# Patient Record
Sex: MALE | Race: WHITE | NOT HISPANIC OR LATINO | Employment: OTHER | URBAN - METROPOLITAN AREA
[De-identification: names, ages, dates, MRNs, and addresses within clinical notes are randomized per-mention and may not be internally consistent; named-entity substitution may affect disease eponyms.]

---

## 2023-09-02 PROBLEM — E78.5 HYPERLIPIDEMIA: Status: ACTIVE | Noted: 2023-09-02

## 2023-09-02 PROBLEM — M17.12 ARTHRITIS OF LEFT KNEE: Status: ACTIVE | Noted: 2023-09-02

## 2023-09-02 PROBLEM — M23.90 INTERNAL DERANGEMENT OF KNEE JOINT: Status: ACTIVE | Noted: 2023-09-02

## 2023-09-02 PROBLEM — I10 HYPERTENSION: Status: ACTIVE | Noted: 2023-09-02

## 2023-09-02 PROBLEM — M17.0 OSTEOARTHRITIS OF KNEES, BILATERAL: Status: ACTIVE | Noted: 2023-09-02

## 2023-09-02 RX ORDER — LANOLIN ALCOHOL/MO/W.PET/CERES
1000 CREAM (GRAM) TOPICAL DAILY
COMMUNITY
End: 2023-10-13 | Stop reason: WASHOUT

## 2023-09-02 RX ORDER — HYDROCHLOROTHIAZIDE 25 MG/1
25 TABLET ORAL DAILY
COMMUNITY
End: 2023-12-08

## 2023-09-02 RX ORDER — ZINC GLUCONATE 50 MG
50 TABLET ORAL DAILY
COMMUNITY
End: 2023-12-11 | Stop reason: WASHOUT

## 2023-09-02 RX ORDER — TRAMADOL HYDROCHLORIDE 50 MG/1
50 TABLET ORAL EVERY 6 HOURS
COMMUNITY
Start: 2022-07-28 | End: 2023-10-13 | Stop reason: WASHOUT

## 2023-09-02 RX ORDER — LISINOPRIL 5 MG/1
5 TABLET ORAL DAILY
COMMUNITY
End: 2023-10-13 | Stop reason: SDUPTHER

## 2023-09-02 RX ORDER — ATORVASTATIN CALCIUM 20 MG/1
20 TABLET, FILM COATED ORAL DAILY
COMMUNITY
End: 2023-10-13

## 2023-10-13 PROBLEM — M48.061 SPINAL STENOSIS, LUMBAR REGION, WITHOUT NEUROGENIC CLAUDICATION: Status: ACTIVE | Noted: 2020-07-13

## 2023-10-13 PROBLEM — R73.03 PREDIABETES: Status: ACTIVE | Noted: 2020-07-13

## 2023-10-13 PROBLEM — M75.112 NONTRAUMATIC INCOMPLETE TEAR OF LEFT ROTATOR CUFF: Status: ACTIVE | Noted: 2020-07-13

## 2023-10-13 PROBLEM — G47.33 OSA (OBSTRUCTIVE SLEEP APNEA): Status: ACTIVE | Noted: 2020-07-13

## 2023-10-13 RX ORDER — LISINOPRIL 5 MG/1
5 TABLET ORAL DAILY
Qty: 90 TABLET | Refills: 3 | OUTPATIENT
Start: 2023-10-13

## 2023-12-08 DIAGNOSIS — I10 PRIMARY HYPERTENSION: Primary | ICD-10-CM

## 2023-12-08 PROBLEM — M25.569 KNEE PAIN: Status: RESOLVED | Noted: 2022-07-28 | Resolved: 2023-12-08

## 2023-12-08 PROBLEM — G89.29 CHRONIC LOW BACK PAIN: Status: ACTIVE | Noted: 2022-12-29

## 2023-12-08 PROBLEM — M54.50 CHRONIC LOW BACK PAIN: Status: ACTIVE | Noted: 2022-12-29

## 2023-12-08 RX ORDER — LANOLIN ALCOHOL/MO/W.PET/CERES
100 CREAM (GRAM) TOPICAL DAILY
COMMUNITY
End: 2023-12-11 | Stop reason: WASHOUT

## 2023-12-08 RX ORDER — HYDROCHLOROTHIAZIDE 25 MG/1
TABLET ORAL
Qty: 90 TABLET | Refills: 0 | Status: SHIPPED | OUTPATIENT
Start: 2023-12-08 | End: 2024-02-14

## 2023-12-09 ASSESSMENT — PROMIS GLOBAL HEALTH SCALE
EMOTIONAL_PROBLEMS: RARELY
CARRYOUT_PHYSICAL_ACTIVITIES: COMPLETELY
RATE_GENERAL_HEALTH: GOOD
CARRYOUT_SOCIAL_ACTIVITIES: GOOD
RATE_PHYSICAL_HEALTH: GOOD
RATE_QUALITY_OF_LIFE: FAIR
RATE_MENTAL_HEALTH: FAIR
RATE_SOCIAL_SATISFACTION: FAIR
RATE_AVERAGE_FATIGUE: MILD
RATE_AVERAGE_PAIN: 5

## 2023-12-11 ENCOUNTER — LAB (OUTPATIENT)
Dept: LAB | Facility: LAB | Age: 63
End: 2023-12-11
Payer: COMMERCIAL

## 2023-12-11 ENCOUNTER — OFFICE VISIT (OUTPATIENT)
Dept: PRIMARY CARE | Facility: CLINIC | Age: 63
End: 2023-12-11
Payer: COMMERCIAL

## 2023-12-11 VITALS
DIASTOLIC BLOOD PRESSURE: 60 MMHG | OXYGEN SATURATION: 97 % | HEART RATE: 59 BPM | BODY MASS INDEX: 32.23 KG/M2 | WEIGHT: 238 LBS | HEIGHT: 72 IN | SYSTOLIC BLOOD PRESSURE: 112 MMHG | TEMPERATURE: 97.7 F

## 2023-12-11 DIAGNOSIS — Z12.5 SCREENING FOR PROSTATE CANCER: ICD-10-CM

## 2023-12-11 DIAGNOSIS — G47.33 OSA (OBSTRUCTIVE SLEEP APNEA): ICD-10-CM

## 2023-12-11 DIAGNOSIS — I10 PRIMARY HYPERTENSION: ICD-10-CM

## 2023-12-11 DIAGNOSIS — Z13.1 SCREENING FOR DIABETES MELLITUS: ICD-10-CM

## 2023-12-11 DIAGNOSIS — Z00.00 ANNUAL PHYSICAL EXAM: Primary | ICD-10-CM

## 2023-12-11 DIAGNOSIS — Z71.3 DIETARY COUNSELING: ICD-10-CM

## 2023-12-11 LAB
ALBUMIN SERPL-MCNC: 4.7 G/DL (ref 3.5–5)
ALP BLD-CCNC: 71 U/L (ref 35–125)
ALT SERPL-CCNC: 27 U/L (ref 5–40)
ANION GAP SERPL CALC-SCNC: 11 MMOL/L
AST SERPL-CCNC: 26 U/L (ref 5–40)
BILIRUB SERPL-MCNC: 0.9 MG/DL (ref 0.1–1.2)
BUN SERPL-MCNC: 14 MG/DL (ref 8–25)
CALCIUM SERPL-MCNC: 9.9 MG/DL (ref 8.5–10.4)
CHLORIDE SERPL-SCNC: 102 MMOL/L (ref 97–107)
CHOLEST SERPL-MCNC: 169 MG/DL (ref 133–200)
CHOLEST/HDLC SERPL: 3.5 {RATIO}
CO2 SERPL-SCNC: 28 MMOL/L (ref 24–31)
CREAT SERPL-MCNC: 1.1 MG/DL (ref 0.4–1.6)
ERYTHROCYTE [DISTWIDTH] IN BLOOD BY AUTOMATED COUNT: 12.6 % (ref 11.5–14.5)
EST. AVERAGE GLUCOSE BLD GHB EST-MCNC: 120 MG/DL
GFR SERPL CREATININE-BSD FRML MDRD: 75 ML/MIN/1.73M*2
GLUCOSE SERPL-MCNC: 107 MG/DL (ref 65–99)
HBA1C MFR BLD: 5.8 %
HCT VFR BLD AUTO: 47.2 % (ref 41–52)
HDLC SERPL-MCNC: 48 MG/DL
HGB BLD-MCNC: 15.9 G/DL (ref 13.5–17.5)
LDLC SERPL CALC-MCNC: 99 MG/DL (ref 65–130)
MCH RBC QN AUTO: 29.6 PG (ref 26–34)
MCHC RBC AUTO-ENTMCNC: 33.7 G/DL (ref 32–36)
MCV RBC AUTO: 88 FL (ref 80–100)
NRBC BLD-RTO: 0 /100 WBCS (ref 0–0)
PLATELET # BLD AUTO: 254 X10*3/UL (ref 150–450)
POTASSIUM SERPL-SCNC: 4.7 MMOL/L (ref 3.4–5.1)
PROT SERPL-MCNC: 6.8 G/DL (ref 5.9–7.9)
PSA SERPL-MCNC: 0.4 NG/ML
RBC # BLD AUTO: 5.37 X10*6/UL (ref 4.5–5.9)
SODIUM SERPL-SCNC: 141 MMOL/L (ref 133–145)
TRIGL SERPL-MCNC: 112 MG/DL (ref 40–150)
WBC # BLD AUTO: 7.1 X10*3/UL (ref 4.4–11.3)

## 2023-12-11 PROCEDURE — 1036F TOBACCO NON-USER: CPT | Performed by: INTERNAL MEDICINE

## 2023-12-11 PROCEDURE — 83036 HEMOGLOBIN GLYCOSYLATED A1C: CPT

## 2023-12-11 PROCEDURE — 84153 ASSAY OF PSA TOTAL: CPT

## 2023-12-11 PROCEDURE — 3078F DIAST BP <80 MM HG: CPT | Performed by: INTERNAL MEDICINE

## 2023-12-11 PROCEDURE — 3008F BODY MASS INDEX DOCD: CPT | Performed by: INTERNAL MEDICINE

## 2023-12-11 PROCEDURE — 80061 LIPID PANEL: CPT

## 2023-12-11 PROCEDURE — 36415 COLL VENOUS BLD VENIPUNCTURE: CPT

## 2023-12-11 PROCEDURE — 99396 PREV VISIT EST AGE 40-64: CPT | Performed by: INTERNAL MEDICINE

## 2023-12-11 PROCEDURE — 3074F SYST BP LT 130 MM HG: CPT | Performed by: INTERNAL MEDICINE

## 2023-12-11 PROCEDURE — 80053 COMPREHEN METABOLIC PANEL: CPT

## 2023-12-11 PROCEDURE — 85027 COMPLETE CBC AUTOMATED: CPT

## 2023-12-11 RX ORDER — DEXTROMETHORPHAN HYDROBROMIDE, GUAIFENESIN 5; 100 MG/5ML; MG/5ML
650 LIQUID ORAL EVERY 8 HOURS PRN
COMMUNITY

## 2023-12-11 ASSESSMENT — PATIENT HEALTH QUESTIONNAIRE - PHQ9
2. FEELING DOWN, DEPRESSED OR HOPELESS: NOT AT ALL
SUM OF ALL RESPONSES TO PHQ9 QUESTIONS 1 AND 2: 0
1. LITTLE INTEREST OR PLEASURE IN DOING THINGS: NOT AT ALL

## 2023-12-11 ASSESSMENT — ENCOUNTER SYMPTOMS
LOSS OF SENSATION IN FEET: 0
OCCASIONAL FEELINGS OF UNSTEADINESS: 0
DEPRESSION: 0

## 2023-12-11 ASSESSMENT — PAIN SCALES - GENERAL: PAINLEVEL: 0-NO PAIN

## 2023-12-11 NOTE — PROGRESS NOTES
[facility]: [facility]  PROGRESS NOTE      Terrell Vernon is a 63 y.o. male that is being seen  today for Annual Exam (CPE).  Subjective   Pt. Is being seen for annual physical exam.Pt. has been doing well.Advance directives discussed with patient   Denies any hospitalisation or urgent care visit.  Previous Labs reviewed .  Pt. Is upto date on screening exams and vaccination  Denies any falls or hospitalisation.     Patient's blood pressure is fairly controlled.  Patient is due for his repeat lab work.      ROS  Negative for fever or chills  Negative for sore throat, ear pain, nasal discharge  Negative for cough, shortness of breath or wheezing  Negative for chest pain, palpitations, swelling of legs  Negative for abdominal pain, constipation, diarrhea, blood in the stools  Negative for urinary complaints  Negative for headache, dizziness, weakness or numbness  Negative for joint pain  Negative for depression or anxiety  All other systems reviewed and were negative   Vitals:    12/11/23 0800   BP: 112/60   Pulse: 59   Temp: 36.5 °C (97.7 °F)   SpO2: 97%      Vitals:    12/11/23 0800   Weight: 108 kg (238 lb)     Body mass index is 32.27 kg/m².  Physical Exam  Constitutional: Patient does not appear to be in any acute distress  Head and Face: NCAT  Eyes: Normal external exam, EOMI  ENT: Normal external inspection of ears and nose. Oropharynx normal.  Cardiovascular: RRR, S1/S2, no murmurs, rubs, or gallops, radial pulses +2, no edema of extremities  Pulmonary: CTAB, no respiratory distress.  Abdomen: +BS, soft, non-tender, nondistended, no guarding or rebound, no masses noted  MSK: No joint swelling, normal movements of all extremities. Range of motion- normal.  Skin- No lesions, contusions, or erythema.  Peripheral puslses palpable bilaterally 2+  Neuro: AAO X3, Cranial nerves 2-12 grossly intact,DTR 2+ in all 4 limbs   Psychiatric: Judgment intact. Appropriate mood and behavior    LABS    [unfilled]  Lab Results   Component Value Date    GLUCOSE 96 12/29/2022    CALCIUM 9.2 12/29/2022     12/29/2022    K 3.7 12/29/2022    CO2 29 12/29/2022    CL 99 12/29/2022    BUN 16 12/29/2022    CREATININE 1.01 12/29/2022     Lab Results   Component Value Date    ALT 23 12/29/2022    AST 25 12/29/2022    ALKPHOS 70 12/29/2022    BILITOT 0.8 12/29/2022     Lab Results   Component Value Date    WBC 6.3 12/29/2022    HGB 15.0 12/29/2022    HCT 44.7 12/29/2022    MCV 86 12/29/2022     12/29/2022     Lab Results   Component Value Date    CHOL 130 (L) 12/08/2022     Lab Results   Component Value Date    HDL 40 (L) 12/08/2022     Lab Results   Component Value Date    LDLCALC 74 12/08/2022     Lab Results   Component Value Date    TRIG 80 12/08/2022     Lab Results   Component Value Date    HGBA1C 6.0 12/08/2022     Other labs not included in the list above were reviewed either before or during this encounter.    History    Past Medical History:   Diagnosis Date    Hyperlipidemia     Hypertension 07/2017    Knee pain 07/28/2022     Past Surgical History:   Procedure Laterality Date    JOINT REPLACEMENT  1/9/23    VASECTOMY  06/1996     Family History   Problem Relation Name Age of Onset    Cancer Mother      Other (Bladder cancer) Mother      Hypertension Father      Dementia Father       No Known Allergies  Current Outpatient Medications on File Prior to Visit   Medication Sig Dispense Refill    acetaminophen (Tylenol 8 HOUR) 650 mg ER tablet Take 1 tablet (650 mg) by mouth every 8 hours if needed for mild pain (1 - 3). Do not crush, chew, or split.      atorvastatin (Lipitor) 20 mg tablet TAKE 1 TABLET BY MOUTH DAILY 90 tablet 0    hydroCHLOROthiazide (HYDRODiuril) 25 mg tablet TAKE 1 TABLET BY MOUTH IN THE  MORNING DAILY 90 tablet 0    lisinopril 5 mg tablet Take 1 tablet (5 mg) by mouth once daily. 90 tablet 0    [DISCONTINUED] cyanocobalamin (Vitamin B-12) 1,000 mcg tablet Take 100 mcg by mouth once  daily.      [DISCONTINUED] hydroCHLOROthiazide (HYDRODiuril) 25 mg tablet Take 1 tablet (25 mg) by mouth once daily.      [DISCONTINUED] zinc gluconate 50 mg tablet Take 1 tablet (50 mg of elemental zinc) by mouth once daily.       No current facility-administered medications on file prior to visit.     Immunization History   Administered Date(s) Administered    Flu vaccine (IIV4), preservative free *Check age/dose* 09/26/2020, 09/13/2022, 10/06/2023    Moderna SARS-CoV-2 Vaccination 12/08/2021    Pfizer COVID-19 vaccine, Fall 2023, 12 years and older, (30mcg/0.3mL) 10/06/2023    Pfizer COVID-19 vaccine, bivalent, age 12 years and older (30 mcg/0.3 mL) 09/13/2022    Pfizer Purple Cap SARS-CoV-2 03/11/2021, 04/01/2021    Tdap vaccine, age 7 year and older (BOOSTRIX) 07/13/2020    Zoster vaccine, recombinant, adult (SHINGRIX) 01/04/2022, 03/07/2022     Patient's medical history was reviewed and updated either before or during this encounter.  ASSESSMENT / PLAN:  Diagnoses and all orders for this visit:  Annual physical exam  FIDEL (obstructive sleep apnea)  -     Referral to Adult Sleep Medicine; Future  Primary hypertension  -     CBC; Future  -     Comprehensive Metabolic Panel; Future  -     Lipid Panel; Future  Dietary counseling  -     Referral to Nutrition Services; Future  Screening for diabetes mellitus  -     Hemoglobin A1C; Future  Screening for prostate cancer  -     Prostate Specific Antigen; Future          gK Jones MD

## 2023-12-12 ENCOUNTER — TELEPHONE (OUTPATIENT)
Dept: PRIMARY CARE | Facility: CLINIC | Age: 63
End: 2023-12-12
Payer: COMMERCIAL

## 2023-12-19 DIAGNOSIS — E78.5 HYPERLIPIDEMIA, UNSPECIFIED HYPERLIPIDEMIA TYPE: ICD-10-CM

## 2023-12-19 DIAGNOSIS — I10 PRIMARY HYPERTENSION: ICD-10-CM

## 2023-12-20 RX ORDER — LISINOPRIL 5 MG/1
5 TABLET ORAL DAILY
Qty: 90 TABLET | Refills: 3 | Status: SHIPPED | OUTPATIENT
Start: 2023-12-20

## 2023-12-20 RX ORDER — ATORVASTATIN CALCIUM 20 MG/1
20 TABLET, FILM COATED ORAL DAILY
Qty: 90 TABLET | Refills: 3 | Status: SHIPPED | OUTPATIENT
Start: 2023-12-20

## 2023-12-29 DIAGNOSIS — G89.29 CHRONIC LOW BACK PAIN, UNSPECIFIED BACK PAIN LATERALITY, UNSPECIFIED WHETHER SCIATICA PRESENT: Primary | ICD-10-CM

## 2023-12-29 DIAGNOSIS — M54.50 CHRONIC LOW BACK PAIN, UNSPECIFIED BACK PAIN LATERALITY, UNSPECIFIED WHETHER SCIATICA PRESENT: Primary | ICD-10-CM

## 2023-12-29 RX ORDER — MELOXICAM 15 MG/1
15 TABLET ORAL DAILY
Qty: 30 TABLET | Refills: 3 | Status: SHIPPED | OUTPATIENT
Start: 2023-12-29 | End: 2024-01-03 | Stop reason: SDUPTHER

## 2023-12-29 RX ORDER — MELOXICAM 15 MG/1
15 TABLET ORAL DAILY
Qty: 30 TABLET | Refills: 3 | Status: SHIPPED | OUTPATIENT
Start: 2023-12-29 | End: 2023-12-29

## 2024-01-03 DIAGNOSIS — G89.29 CHRONIC LOW BACK PAIN, UNSPECIFIED BACK PAIN LATERALITY, UNSPECIFIED WHETHER SCIATICA PRESENT: ICD-10-CM

## 2024-01-03 DIAGNOSIS — M54.50 CHRONIC LOW BACK PAIN, UNSPECIFIED BACK PAIN LATERALITY, UNSPECIFIED WHETHER SCIATICA PRESENT: ICD-10-CM

## 2024-01-03 RX ORDER — MELOXICAM 15 MG/1
15 TABLET ORAL DAILY
Qty: 30 TABLET | Refills: 3 | Status: SHIPPED | OUTPATIENT
Start: 2024-01-03 | End: 2024-05-02

## 2024-01-11 NOTE — PROGRESS NOTES
Patient: Terrell Veronn    [id number]  : 1960 -- AGE 63 y.o.    Provider: Sotero Chu MD     [facility]   Service Date: 2024              [facility]  New Visit Note        The patient's referring provider is: Kg Jones MD    HPI:  Terrell Vernon is a 63 y.o. male with severe sleep apnea on CPAP, with PMH notable for HTN, HLD, prediabetes, and spinal stenosis, who presents today to establish care for his sleep apnea. His goal today is to make sure that his machine is working properly and if he needs a new one.    Snoring and nocturnal heartburn prior to starting CPAP. With CPAP there are no witnessed apneas, waking gasping/choking, night sweats, morning headaches, or morning sore throat. He wakes 1-3 times per night, occasionally has nocturia, occasional bruxism, and occasional morning dry mouth. Sleep is restless due to back pain causing him to change positions frequently.    Sleep walking: No  Dream enactment: No  Sleep paralysis: occasionally dreams that he is paralyzed   Hypnagogic/hypnopompic hallucinations: No    Mask: full face    Using CPAP nightly with no issues. This is his second machine and it is 4.5 years old. He has his original machine as a back-up in case he ever needs.     Patient is keeping the equipment clean and supplies are being renewed at appropriate intervals. Uses a So Clean machine.    DAYTIME SYMPTOMS  [place]: 0  CHASTITY: 10/28  Daytime sleepiness: No  Fatigue: No  Trouble with memory/concentration: No  Dozing: No  Feeling sleepy while driving: Denies    RLS symptoms: No   Cataplexy: No    SLEEP HABITS:   Melatonin 5 mg nightly  Preferred sleep position: starts the night supine, falls asleep on side  Bedtime: 10 pm, sleep latency up to an hour. Bedtime is 10 pm to 12 am on weekends, usually asleep by 11 am  Wake time: 7-8 am every day  # of nocturnal awakenings: 1-3 due to back pain  Napping: no  Total estimated sleep  per 24 hrs: 7-8 hours    PRIOR SLEEP STUDIES:  Split night PSG at [facility], 7/11/2012: pt's weighed 245 lbs. Symptoms were snoring, difficulty initiating and maintainig sleep, witnesses pauses in breathing, and daytime somnolence. Severe sleep apnea with AHI4% 45.6/h with NAM 22.9/h during diagnostic portion, worse when supine (93.5/h during the 61.0 minutes supine vs 9.6/h during the 67.0 minutes non-supine). Severe respiratory related sleep fragmentation with 79 arousals/h. Loud snoring. SpO2 lucero 87%, baseline 96%. CPAP mask was the Mirage Quattro full face mask (medium) due to mouth breathing. AutoCPAP 7-11 cm H2O reduced AHI to 3.6/h. During the treatment portion the respiratory events consisted of 10 central apneas and arousal index decreased overall to 14/h. Leg movement index during diagnostic portion was 6.1/h and 9.7/h during the CPAP portion.        Patient Active Problem List   Diagnosis    Arthritis of left knee    Hyperlipidemia    Hypertension    Internal derangement of knee joint    Osteoarthritis of knees, bilateral    Nontraumatic incomplete tear of left rotator cuff    FIDEL (obstructive sleep apnea)    Prediabetes    Spinal stenosis, lumbar region, without neurogenic claudication    Chronic low back pain     Past Medical History:   Diagnosis Date    Hyperlipidemia     Hypertension 07/2017    Knee pain 07/28/2022     Past Surgical History:   Procedure Laterality Date    JOINT REPLACEMENT  1/9/23    VASECTOMY  06/1996    WISDOM TOOTH EXTRACTION  6/86     Current Outpatient Medications   Medication Sig Dispense Refill    acetaminophen (Tylenol 8 HOUR) 650 mg ER tablet Take 1 tablet (650 mg) by mouth every 8 hours if needed for mild pain (1 - 3). Do not crush, chew, or split.      atorvastatin (Lipitor) 20 mg tablet TAKE 1 TABLET BY MOUTH ONCE  DAILY 90 tablet 3    hydroCHLOROthiazide (HYDRODiuril) 25 mg tablet TAKE 1 TABLET BY MOUTH IN THE  MORNING DAILY 90 tablet 0  "   lisinopril 5 mg tablet TAKE 1 TABLET BY MOUTH ONCE  DAILY 90 tablet 3    meloxicam (Mobic) 15 mg tablet Take 1 tablet (15 mg) by mouth once daily. 30 tablet 3     No current facility-administered medications for this visit.     No Known Allergies    FAMILY HISTORY OF SLEEP DISORDERS: no known family history of sleep apnea  Family History   Problem Relation Name Age of Onset    Cancer Mother      Other (Bladder cancer) Mother      Hypertension Father      Dementia Father         SOCIAL HISTORY  Employment: retired   Lives with: wife  Alcohol: 2 times/week  Cigarettes: never. Quit chew tobacco pouches in 8/22  Illicits: none  Caffeine: 3 cups/coffee/day     ROS: 12 point ROS positive for joint pain that affects sleep and muscle pain/cramps - shoulder, knee, back pains.  All other items/systems were reviewed and are negative.    PHYSICAL EXAMINATION:   Vitals:    01/12/24 0812   BP: 126/79   BP Location: Left arm   Patient Position: Sitting   BP Cuff Size: Large adult   Pulse: 60   SpO2: 95%   Weight: 109 kg (240 lb)   Height: 1.905 m (6' 3\")     Body mass index is 30 kg/m².  General: Awake. Alert. Comfortable. No apparent distress.   Speech: Normal  Comprehension: Normal  Mood: Stable  Affect: Appropriate  Eyes:   Eyelids: normal            ENT:          Nares patent bilaterally. Septum deviation to the right. Casas tongue position III. Tongue scalloping not present, tongue is enlarged, soft palate is mildly elongated, hard palate is not high arched. Uvula is not enlarged. Retrognathia is not present. Tonsils are not enlarged. Dentition very good.           Neck:          Circumference: mildly large in caliber  Cardiac: Regular in rate and rhythm. No murmurs. No edema in bilateral lower extremities.  Pul:         Clear to auscultation bilaterally. Normal respiratory effort   Abd:         obese  Neuro: Alert, well-oriented. Cranial nerves II-XII grossly normal and symmetric.  Moves all limbs " symmetrically with no evidence of significant focal weakness. No abnormal movements noted. Normal gait      CPAP download directly from machine today:  Device: ResMed AirSense 10 AutoSet  DME: [employer]  Setup date: per pt - 2019  Date range: last 30 days  Days used: 100%  Days used >4 hours: 100%  Average usage (days used): 9.1 h  Settin-20 cm H2O, EPR 2  Pressure: 95th %ile 11.5 cm H2O  Leak: 16 L/min (95th %ile)  AHI: 1.5      LABS/DIAGNOSTICS:  Lab Results   Component Value Date    HGB 15.9 2023    CO2 28 2023    HGBA1C 5.8 (H) 2023        ASSESSMENT AND PLAN: Mr. Terrell Vernon is a 63 y.o. male with a history of severe sleep apnea doing well on CPAP.     #FIDEL  -Patient's sleep apnea is under very good control with CPAP, he is deriving significant subjective benefit from treatment, his compliance is excellent, and mask leak is well controlled overall.  -continue current CPAP settings  -advised pt on signs of machine malfunction to look out for to indicate that he needs a new machine  -advised cleaning with just gentle soap and water  -We discussed the risk factors for sleep apnea, pathophysiology of sleep apnea, treatment options, and potential long-term complications of untreated FIDEL, including cardiovascular and metabolic complications.      All of the above was discussed with the patient in detail. He voiced an understanding of the above and was agreeable to proceed further as advised. Procedure for the sleep study was discussed with him.  60 minutes were spent on this encounter, including time reviewing the chart, reviewing prior sleep study, conducting the H&P, counseling the patient, and documenting.    FOLLOW UP:  1 year, sooner if needed

## 2024-01-12 ENCOUNTER — OFFICE VISIT (OUTPATIENT)
Dept: SLEEP MEDICINE | Facility: CLINIC | Age: 64
End: 2024-01-12
Payer: COMMERCIAL

## 2024-01-12 VITALS
HEIGHT: 75 IN | HEART RATE: 60 BPM | SYSTOLIC BLOOD PRESSURE: 126 MMHG | DIASTOLIC BLOOD PRESSURE: 79 MMHG | OXYGEN SATURATION: 95 % | WEIGHT: 240 LBS | BODY MASS INDEX: 29.84 KG/M2

## 2024-01-12 DIAGNOSIS — G47.33 OSA (OBSTRUCTIVE SLEEP APNEA): ICD-10-CM

## 2024-01-12 PROCEDURE — 99205 OFFICE O/P NEW HI 60 MIN: CPT | Performed by: PSYCHIATRY & NEUROLOGY

## 2024-01-12 PROCEDURE — 1036F TOBACCO NON-USER: CPT | Performed by: PSYCHIATRY & NEUROLOGY

## 2024-01-12 PROCEDURE — 3078F DIAST BP <80 MM HG: CPT | Performed by: PSYCHIATRY & NEUROLOGY

## 2024-01-12 PROCEDURE — 3074F SYST BP LT 130 MM HG: CPT | Performed by: PSYCHIATRY & NEUROLOGY

## 2024-01-12 RX ORDER — ACETAMINOPHEN 500 MG
5 TABLET ORAL NIGHTLY PRN
COMMUNITY

## 2024-01-12 ASSESSMENT — ENCOUNTER SYMPTOMS
LOSS OF SENSATION IN FEET: 0
OCCASIONAL FEELINGS OF UNSTEADINESS: 0
DEPRESSION: 0

## 2024-01-15 ENCOUNTER — OFFICE VISIT (OUTPATIENT)
Dept: PAIN MEDICINE | Facility: HOSPITAL | Age: 64
End: 2024-01-15
Payer: COMMERCIAL

## 2024-01-15 DIAGNOSIS — M48.061 SPINAL STENOSIS, LUMBAR REGION, WITHOUT NEUROGENIC CLAUDICATION: Primary | ICD-10-CM

## 2024-01-15 PROCEDURE — 1036F TOBACCO NON-USER: CPT | Performed by: ANESTHESIOLOGY

## 2024-01-15 PROCEDURE — 99204 OFFICE O/P NEW MOD 45 MIN: CPT | Performed by: ANESTHESIOLOGY

## 2024-01-15 PROCEDURE — 99214 OFFICE O/P EST MOD 30 MIN: CPT | Performed by: ANESTHESIOLOGY

## 2024-01-15 RX ORDER — GABAPENTIN 300 MG/1
CAPSULE ORAL
Qty: 180 CAPSULE | Refills: 0 | Status: SHIPPED | OUTPATIENT
Start: 2024-01-15

## 2024-01-15 ASSESSMENT — PAIN - FUNCTIONAL ASSESSMENT: PAIN_FUNCTIONAL_ASSESSMENT: 0-10

## 2024-01-15 ASSESSMENT — PAIN SCALES - GENERAL: PAINLEVEL_OUTOF10: 6

## 2024-01-15 NOTE — PROGRESS NOTES
Subjective   Patient ID: Terrell Vernon is a 63 y.o. male.    HPI  63-year-old male presenting as a new patient to our clinic today. Patient presents complaining of lower back pain that radiates into the anterior left thigh to his knee. He has a history of a left knee replacement in January 9 of last year. He is a very active gentleman who is a retired  and walks 3 to 4 miles every day. Now, he complains that he has significant 8/10 pain after standing and walking for period of time. The pain is relieved with sitting. He also endorses some numbness/tingling in his anterior thigh. He does physical therapy stretches and exercises on a daily basis. He only takes meloxicam for pain which he says does not help. MRI in December 2021 shows bilateral foraminal stenosis at L1-2 as well as severe left foraminal stenosis at L4-5 with Modic type II changes at L4-5 and L5-S1.  Review of Systems   All other systems reviewed and are negative.      Objective   Physical Exam  PHYSICAL EXAM  Vitals signs reviewed  Constitutional:       General: Not in acute distress     Appearance: Normal appearance. Not ill-appearing.  HENT:     Head: Normocephalic and atraumatic  Eyes:     Conjunctiva/sclera: Conjunctivae normal  Cardiovascular:     Rate and Rhythm: Normal rate and regular rhythm  Pulmonary:     Effort: No respiratory distress  Abdominal:     Palpations: Abdomen is soft  Musculoskeletal: LYONS  Skin:     General: Skin is warm and dry  Neurological:     General: No focal deficit present  Psychiatric:         Mood and Affect: Mood normal         Behavior: Behavior normal     Advanced Exam   Inspection: No gross deformities, no surgical scars  Palpation: No tenderness of patient of lumbar midline, lumbar paraspinals, bilateral SI joints  ROM: Normal range of motion of the lumbar flexion extension  Motor: 5-5 strength upper and lower extremities  Sensory: Negative for sensory abnormalities in upper and lower  extremities  Reflexes: 2+ reflexes bilateral upper and lower extremities  Negative Babinski, negative Javi, negative Romberg, no clonus appreciated on exam     Lumbar: Negative straight leg raising on the right and positive on the left, negative for facet loading     Sacral: Negative Franklin, negative Gaenslen's     Piriformis: Negative pain with deep palpation of bilateral piriformis muscles, negative FAIR, negative pace     Hip: Negative for pain with anterior, lateral, posterior palpation of hip joints, negative FADIR, negative for internal/external rotation of the hip, negative logroll      Assessment/Plan   Diagnoses and all orders for this visit:  Spinal stenosis, lumbar region, without neurogenic claudication  -     FL pain management TC; Future  -     Transforaminal; Future  Terrell Vernon is a pleasant 63-year-old male presenting as a new patient in our clinic today. Patient has history of a left knee replacement in January of last year and complains of low back pain that radiates down the left leg to just before the knee and the L2 dermatomal distribution. He performed physical therapy stretches and exercises on a daily basis and takes meloxicam for pain. We discussed with the patient that his history, imaging, and physical exam findings are most consistent with neurogenic claudication due to lumbar spinal stenosis. We discussed the mainstay of treatment in the form of physical therapy, medication optimization, as well as interventional injections. We discussed the risks and benefits of doing the injection as well as the side effects of gabapentin. The patient is understanding and wishes to proceed.    Plan:  Consider Left L1/2 the pain seems to radiate in the L1/L2 distribution (the patient has disc displacement of the left side at L1/2 with moderate central canal stenosis), L4/5 and L5/S1 (the patient has severe left-sided L4/5 and L5/S1 foraminal stenosis but the radicular pain stops just above the  knee) transforaminal epidural steroid injection. Patient does not take any blood thinners.  Gabapentin up titration from 300 mg nightly to be increased 600mg three times daily.  Goals of therapy, the dosages and side effects of the medication were discussed in detail with the patient.  The patient understands and agrees to take the medication as prescribed.  We discussed that if we discontinue this in the future, it would need to be weaned down rather than stopped abruptly.  Instructions and a hardcopy form were given to the patient and explained.  We discussed continuing physical therapy to help manage the patient's painful condition.  The patient was counseled that core muscle strengthening will improve the long term prognosis in regards to pain and may also increase range of motion and mobility.  The patient's questions were answered and she was agreeable to this course.    All the patient's questions and concerns were answered. He was invited to follow-up with any issues.

## 2024-01-30 ENCOUNTER — APPOINTMENT (OUTPATIENT)
Dept: RADIOLOGY | Facility: HOSPITAL | Age: 64
End: 2024-01-30
Payer: COMMERCIAL

## 2024-02-12 ENCOUNTER — OFFICE VISIT (OUTPATIENT)
Dept: PAIN MEDICINE | Facility: HOSPITAL | Age: 64
End: 2024-02-12
Payer: COMMERCIAL

## 2024-02-12 DIAGNOSIS — M48.062 SPINAL STENOSIS, LUMBAR REGION WITH NEUROGENIC CLAUDICATION: Primary | ICD-10-CM

## 2024-02-12 PROCEDURE — 99213 OFFICE O/P EST LOW 20 MIN: CPT | Performed by: ANESTHESIOLOGY

## 2024-02-12 PROCEDURE — 1036F TOBACCO NON-USER: CPT | Performed by: ANESTHESIOLOGY

## 2024-02-12 RX ORDER — DESIPRAMINE HYDROCHLORIDE 10 MG/1
10 TABLET ORAL NIGHTLY
Qty: 30 TABLET | Refills: 11 | Status: SHIPPED | OUTPATIENT
Start: 2024-02-12 | End: 2025-02-11

## 2024-02-12 RX ORDER — GABAPENTIN 600 MG/1
600 TABLET ORAL 3 TIMES DAILY
Qty: 90 TABLET | Refills: 5 | Status: SHIPPED | OUTPATIENT
Start: 2024-02-12 | End: 2024-08-10

## 2024-02-12 ASSESSMENT — PAIN SCALES - GENERAL: PAINLEVEL: 6

## 2024-02-12 NOTE — PROGRESS NOTES
Subjective   Patient ID: Terrell Vernon is a 63 y.o. male with a past medical history of Left knee replacement 1/2023 presents with low back pain with radiation into his left anterior thigh above the knee. He is a very active gentleman who is a retired  and walks 3 to 4 miles every day. Now, he complains that he has significant 8/10 pain after standing and walking for period of time. The pain is relieved with sitting. He also endorses some numbness/tingling in his anterior thigh. He does physical therapy stretches and exercises on a daily basis. MRI in December 2021 shows bilateral foraminal stenosis at L1-2 as well as severe left foraminal stenosis at L4-5 with Modic type II changes at L4-5 and L5-S1.  He was last seen in this clinic 1/15/2024 were started on gabapentin which she is currently taking 600 mg 3 times daily with relief.  He also takes meloxicam with relief.  He was scheduled for epidural steroid injection, however, canceled his appointment as his pain improved.  He states that he would like to avoid injections unless he has severe debilitating pain.    Physical Therapy: The patient has done six or more weeks of physical therapy in the past six months with minimal improvement  Other Conservative Measures he has tried: Heating Pad and Ice  Classes of medications tried in the past: Acetaminophen, NSAIDs, and Gabapentenoids  Review of Systems   13-point ROS done and negative except for HPI.     Current Outpatient Medications   Medication Instructions    acetaminophen (TYLENOL 8 HOUR) 650 mg, oral, Every 8 hours PRN, Do not crush, chew, or split.    atorvastatin (LIPITOR) 20 mg, oral, Daily    gabapentin (Neurontin) 300 mg capsule Follow faxed titration schedule    hydroCHLOROthiazide (HYDRODiuril) 25 mg tablet TAKE 1 TABLET BY MOUTH IN THE  MORNING DAILY    lisinopril 5 mg, oral, Daily    melatonin 5 mg, oral, Nightly PRN    meloxicam (MOBIC) 15 mg, oral, Daily       Past Medical History:    Diagnosis Date    Hyperlipidemia     Hypertension 07/2017    Knee pain 07/28/2022        Past Surgical History:   Procedure Laterality Date    JOINT REPLACEMENT  1/9/23    VASECTOMY  06/1996    WISDOM TOOTH EXTRACTION  6/86        Family History   Problem Relation Name Age of Onset    Cancer Mother      Other (Bladder cancer) Mother      Hypertension Father      Dementia Father          No Known Allergies     Objective     There were no vitals filed for this visit.     Physical Exam  General: NAD, well groomed, well nourished  Eyes: Non-icteric sclera, EOMI  Ears, Nose, Mouth, and Throat: External ears and nose appear to be without deformity or rash. No lesions or masses noted. Hearing is grossly intact.   Neck: Trachea midline  Respiratory: Nonlabored breathing   Cardiovascular: no peripheral edema   Skin: No rashes or open lesions/ulcers identified on skin.  Extremity:     Back:   Palpation: Mild tenderness to palpation over lumbar paraspinous muscles.   Straight leg raise: does not reproduce their pain, bilaterally     Neurologic:   Cranial nerves grossly intact.   Strength 5/5 and symmetric plantar/dorsiflexion   Sensation: Normal to light touch throughout, pinprick intact throughout.    Psychiatric: Alert, orientation to person, place, and time. Cooperative.    Imaging personally reviewed and independently interpreted: MRI in December 2021 shows bilateral foraminal stenosis at L1-2 as well as severe left foraminal stenosis at L4-5 with Modic type II changes at L4-5 and L5-S1.     Assessment/Plan   63-year-old male presenting as a new patient in our clinic today. Patient has history of a left knee replacement in January of last year and complains of low back pain that radiates down the left leg to just before the knee and the L2 dermatomal distribution. He performed physical therapy stretches and exercises on a daily basis and takes meloxicam for pain.  During his last visit he was started on gabapentin which  he is taking at 600 mg 3 times daily.  We had scheduled lumbar epidural steroid injection during her last visit but he canceled as he wants to avoid injections, and he had improvement with the gabapentin.  Today we again discussed that given his history, imaging he likely has neurogenic claudication secondary to lumbar spinal stenosis.    Plan:  -Renewed gabapentin 600 mg daily  -Start desipramine 10 mg 3 times daily, as needed  -If patient is to have severe pain, he was instructed to call us so that we can schedule epidural steroid injection.  Likely will perform left transforaminal at L1 and L4.  If he has recurrence of right-sided symptoms, could perform bilateral transforaminal injections.      The patient was invited to contact us back anytime with any questions or concerns and follow-up with us in the office as needed.     Diagnoses and all orders for this visit:  Spinal stenosis, lumbar region with neurogenic claudication      This note was generated with the aid of dictation software, there may be typos despite my attempts at proofreading.

## 2024-02-13 DIAGNOSIS — I10 PRIMARY HYPERTENSION: ICD-10-CM

## 2024-02-14 PROBLEM — G47.33 OBSTRUCTIVE SLEEP APNEA SYNDROME: Status: RESOLVED | Noted: 2020-07-13 | Resolved: 2024-02-14

## 2024-02-14 RX ORDER — HYDROCHLOROTHIAZIDE 25 MG/1
TABLET ORAL
Qty: 90 TABLET | Refills: 2 | Status: SHIPPED | OUTPATIENT
Start: 2024-02-14

## 2024-02-16 DIAGNOSIS — Z47.1 AFTERCARE FOLLOWING LEFT KNEE JOINT REPLACEMENT SURGERY: Primary | ICD-10-CM

## 2024-02-16 DIAGNOSIS — Z96.652 AFTERCARE FOLLOWING LEFT KNEE JOINT REPLACEMENT SURGERY: Primary | ICD-10-CM

## 2024-02-16 RX ORDER — AMOXICILLIN 500 MG/1
CAPSULE ORAL
Qty: 4 CAPSULE | Refills: 6 | Status: SHIPPED | OUTPATIENT
Start: 2024-02-16

## 2024-02-21 DIAGNOSIS — M48.062 SPINAL STENOSIS, LUMBAR REGION WITH NEUROGENIC CLAUDICATION: Primary | ICD-10-CM

## 2024-02-21 RX ORDER — DESIPRAMINE HYDROCHLORIDE 10 MG/1
10 TABLET ORAL 3 TIMES DAILY
Qty: 270 TABLET | Refills: 3 | Status: SHIPPED | OUTPATIENT
Start: 2024-02-21 | End: 2025-02-20

## 2024-02-23 ENCOUNTER — OFFICE VISIT (OUTPATIENT)
Dept: ORTHOPEDIC SURGERY | Facility: CLINIC | Age: 64
End: 2024-02-23
Payer: COMMERCIAL

## 2024-02-23 ENCOUNTER — HOSPITAL ENCOUNTER (OUTPATIENT)
Dept: RADIOLOGY | Facility: CLINIC | Age: 64
Discharge: HOME | End: 2024-02-23
Payer: COMMERCIAL

## 2024-02-23 VITALS — BODY MASS INDEX: 29.84 KG/M2 | HEIGHT: 75 IN | WEIGHT: 240 LBS

## 2024-02-23 DIAGNOSIS — Z96.652 AFTERCARE FOLLOWING LEFT KNEE JOINT REPLACEMENT SURGERY: ICD-10-CM

## 2024-02-23 DIAGNOSIS — Z47.1 AFTERCARE FOLLOWING LEFT KNEE JOINT REPLACEMENT SURGERY: ICD-10-CM

## 2024-02-23 PROCEDURE — 73562 X-RAY EXAM OF KNEE 3: CPT | Mod: LT

## 2024-02-23 PROCEDURE — 1036F TOBACCO NON-USER: CPT | Performed by: PHYSICIAN ASSISTANT

## 2024-02-23 PROCEDURE — 99214 OFFICE O/P EST MOD 30 MIN: CPT | Performed by: PHYSICIAN ASSISTANT

## 2024-02-23 PROCEDURE — 73562 X-RAY EXAM OF KNEE 3: CPT | Mod: LEFT SIDE | Performed by: RADIOLOGY

## 2024-02-23 ASSESSMENT — PAIN - FUNCTIONAL ASSESSMENT: PAIN_FUNCTIONAL_ASSESSMENT: NO/DENIES PAIN

## 2024-02-23 NOTE — PROGRESS NOTES
ABEBA Brizuela, BROOKS, ATC  Adult Reconstruction and Joint Replacement Surgery  Phone: [phone]     Fax:[phone]            Chief Complaint   Patient presents with    Left Knee - Follow-up       HPI:  Terrell Vernon is a pleasant 63 y.o. year-old male here for follow-up of their side: left total knee arthroplasty by Dr. Rosario.  The patient is approximately 1 year(s) postop.The patient has no mechanical symptoms.  The patient has no swelling and pain.   The patients wound has healed uneventfully.  The patient has been doing HEP and/or outpatient PT.  No complications postoperatively.  The patient is very happy with the result of the operation.  He is seeing Dr. Anthony for spinal stenosis.  He is currently on gabapentin and meloxicam which does seem to help.  He is also seeing pain management for this.    Review of Systems  Past Medical History:   Diagnosis Date    Hyperlipidemia     Hypertension 07/2017    Knee pain 07/28/2022    Obstructive sleep apnea syndrome 07/13/2020     Patient Active Problem List   Diagnosis    Arthritis of left knee    Hyperlipidemia    Hypertension    Internal derangement of knee joint    Osteoarthritis of knees, bilateral    Nontraumatic incomplete tear of left rotator cuff    FIDLE (obstructive sleep apnea)    Prediabetes    Spinal stenosis, lumbar region with neurogenic claudication    Chronic low back pain     Medication Documentation Review Audit       Reviewed by Hattie Palencia on 02/23/24 at 1014      Medication Order Taking? Sig Documenting Provider Last Dose Status   acetaminophen (Tylenol 8 HOUR) 650 mg ER tablet [id number] No Take 1 tablet (650 mg) by mouth every 8 hours if needed for mild pain (1 - 3). Do not crush, chew, or split. Historical Provider, MD Taking Active   amoxicillin (Amoxil) 500 mg capsule [id number]  Take 4 Tablets 1 Hour Prior to Dental Procedure or Cleaning. Hallie Ramirez PA-C  Active   atorvastatin (Lipitor) 20 mg tablet  [id number] No TAKE 1 TABLET BY MOUTH ONCE  DAILY Kg Jones MD Taking Active   desipramine (Norpramin) 10 mg tablet [id number]  Take 1 tablet (10 mg) by mouth once daily at bedtime. Alexandru Holland MD PhD  Active   desipramine (Norpramin) 10 mg tablet [id number]  Take 1 tablet (10 mg) by mouth 3 times a day. Alexandru Holland MD PhD  Active   gabapentin (Neurontin) 300 mg capsule [id number]  Follow faxed titration schedule Alexandru Holland MD PhD  Active   gabapentin (Neurontin) 600 mg tablet [id number]  Take 1 tablet (600 mg) by mouth 3 times a day. Alexandru Holland MD PhD  Active   hydroCHLOROthiazide (HYDRODiuril) 25 mg tablet [id number]  TAKE 1 TABLET BY MOUTH IN THE  MORNING DAILY Kg Jones MD  Active   lisinopril 5 mg tablet [id number] No TAKE 1 TABLET BY MOUTH ONCE  DAILY Kg Jones MD Taking Active   melatonin 5 mg tablet [id number] No Take 1 tablet (5 mg) by mouth as needed at bedtime for sleep. Kareem Jacques MD Taking Active   meloxicam (Mobic) 15 mg tablet [id number] No Take 1 tablet (15 mg) by mouth once daily. Essence Marsh PA-C Taking Active                  No Known Allergies  Social History     Socioeconomic History    Marital status:      Spouse name: Not on file    Number of children: Not on file    Years of education: Not on file    Highest education level: Not on file   Occupational History    Not on file   Tobacco Use    Smoking status: Never    Smokeless tobacco: Former    Tobacco comments:     Quit chewing tobacco 08/12/22   Vaping Use    Vaping Use: Never used   Substance and Sexual Activity    Alcohol use: Yes     Alcohol/week: 1.0 - 2.0 standard drink of alcohol     Types: 1 - 2 Glasses of wine per week    Drug use: Not Currently    Sexual activity: Not Currently     Partners: Female   Other Topics Concern    Not on file   Social History Narrative    Not on file     Social Determinants of Health     Financial Resource Strain: Not on file   Food Insecurity: Not  on file   Transportation Needs: Not on file   Physical Activity: Not on file   Stress: Not on file   Social Connections: Not on file   Intimate Partner Violence: Not on file   Housing Stability: Not on file     Past Surgical History:   Procedure Laterality Date    JOINT REPLACEMENT  1/9/23    VASECTOMY  06/1996    WISDOM TOOTH EXTRACTION  6/86       Physical Exam  side: left Knee  There were no vitals filed for this visit.  AxO x 3 in NAD.   Assistive Device: no device. Coordination and balance intact.  Normal bilateral upper and lower extremities.  No erythema, ecchymosis, temperature changes. No popliteal lymphadenopathy,  No overlying lesion  Mood: euthymic  Respirations non-labored  The incision is midline healing well with no signs of surrounding infection, dehiscence or drainage.   Neurovascular exam is at baseline.  No instability varus or valgus stressing the knee at 0, 30 or 60 degrees.  No instability in the AP plane at 90 degrees.  Range of motion: 0 degrees extension, 120 degrees flexion  5/5 hip flexion/knee extension/DF/PF/EHL  SILT in christine/saph/ per/tib distribution   Extremities warm and well perfused.  No lower extremity calf tenderness, warmth or swelling. Lower extremity well perfused  2+ Femoral/DP/PT pulses bilaterally    Imaging:  No images are attached to the encounter.    I personally reviewed multiple views of the knee today in clinic. Status post side: left Total Knee Arthroplasty. The implant is well fixed, well aligned.  No evidence of cristina-implant fracture, lucency or dislocation.    Impression/Plan:  Terrell Vernon is doing well post-operatively and happy with the results of the operation.     S/P side: left Total Knee Arthroplasty  I talked with patient at length about activity precautions and progression of activities. The patient understands their permanent precautions.   3. Discussed the importance of dental prophylactic dental antibiotics lifelong. Patient may request medication  refill through MyChart,       Pharmacy or surgeons office.    All questions answered.    Follow-up 5 years with x-rays at next visit.    ABEBA Brizuela, PALutherC, ATC  Orthopedic Physician Assisant  Adult Reconstruction and Total Joint Replacement  General Orthopedics  Department of Orthopaedic Surgery  [place] [address]  [facility] messaging preferred

## 2024-04-05 DIAGNOSIS — M48.062 SPINAL STENOSIS, LUMBAR REGION WITH NEUROGENIC CLAUDICATION: Primary | ICD-10-CM

## 2024-04-05 RX ORDER — OXYCODONE HYDROCHLORIDE 5 MG/1
5 TABLET ORAL EVERY 6 HOURS PRN
Qty: 28 TABLET | Refills: 0 | Status: SHIPPED | OUTPATIENT
Start: 2024-04-05 | End: 2024-04-12

## 2024-04-17 ENCOUNTER — TELEPHONE (OUTPATIENT)
Dept: PAIN MEDICINE | Facility: HOSPITAL | Age: 64
End: 2024-04-17
Payer: COMMERCIAL

## 2024-04-17 DIAGNOSIS — M47.817 LUMBOSACRAL SPONDYLOSIS WITHOUT MYELOPATHY: Primary | ICD-10-CM

## 2024-04-17 RX ORDER — LORAZEPAM 1 MG/1
1 TABLET ORAL 2 TIMES DAILY PRN
Qty: 2 TABLET | Refills: 0 | Status: SHIPPED | OUTPATIENT
Start: 2024-04-17 | End: 2024-04-18

## 2024-04-30 ENCOUNTER — HOSPITAL ENCOUNTER (OUTPATIENT)
Dept: RADIOLOGY | Facility: CLINIC | Age: 64
Discharge: HOME | End: 2024-04-30
Payer: COMMERCIAL

## 2024-04-30 DIAGNOSIS — M47.817 LUMBOSACRAL SPONDYLOSIS WITHOUT MYELOPATHY: ICD-10-CM

## 2024-04-30 PROCEDURE — 72148 MRI LUMBAR SPINE W/O DYE: CPT

## 2024-04-30 PROCEDURE — 72148 MRI LUMBAR SPINE W/O DYE: CPT | Performed by: RADIOLOGY

## 2024-05-01 ENCOUNTER — APPOINTMENT (OUTPATIENT)
Dept: RADIOLOGY | Facility: CLINIC | Age: 64
End: 2024-05-01
Payer: COMMERCIAL

## 2024-05-08 ENCOUNTER — OFFICE VISIT (OUTPATIENT)
Dept: ORTHOPEDIC SURGERY | Facility: CLINIC | Age: 64
End: 2024-05-08
Payer: COMMERCIAL

## 2024-05-08 DIAGNOSIS — M48.061 DEGENERATIVE LUMBAR SPINAL STENOSIS: Primary | ICD-10-CM

## 2024-05-08 PROCEDURE — 99214 OFFICE O/P EST MOD 30 MIN: CPT | Performed by: ORTHOPAEDIC SURGERY

## 2024-05-08 RX ORDER — METHYLPREDNISOLONE 4 MG/1
4 TABLET ORAL ONCE
Qty: 21 TABLET | Refills: 0 | Status: SHIPPED | OUTPATIENT
Start: 2024-05-08 | End: 2024-05-08

## 2024-05-08 RX ORDER — MELOXICAM 15 MG/1
15 TABLET ORAL DAILY
Qty: 30 TABLET | Refills: 11 | Status: SHIPPED | OUTPATIENT
Start: 2024-05-08 | End: 2025-05-08

## 2024-05-08 ASSESSMENT — PAIN - FUNCTIONAL ASSESSMENT: PAIN_FUNCTIONAL_ASSESSMENT: 0-10

## 2024-05-08 ASSESSMENT — PAIN SCALES - GENERAL: PAINLEVEL_OUTOF10: 7

## 2024-05-08 ASSESSMENT — PAIN DESCRIPTION - DESCRIPTORS: DESCRIPTORS: ACHING

## 2024-05-08 NOTE — LETTER
May 8, 2024     Kg Jones MD  [address]  [place] [address]    Patient: Terrell Vernon   YOB: 1960   Date of Visit: 5/8/2024       Dear Dr. Kg Jones MD:    Thank you for referring Terrell Vernon to me for evaluation. Below are my notes for this consultation.  If you have questions, please do not hesitate to call me. I look forward to following your patient along with you.       Sincerely,     Abebe Anthony MD      CC: No Recipients  ______________________________________________________________________________________    Terrell returns.  He is a 63-year-old retired  I have seen in the past.    He has had varying degrees of back and leg pain for several months.  After I saw him last summer, he improved significantly.  He was able to walk 4 to 5 miles at a time without discomfort.  In December however he had recurrent back pain and this has persisted.  He does have pain in his left anterior thigh.  It does not radiate into the left calf or foot.  No right leg symptoms.    He is due to see Dr. Fisher in the next 2 weeks.    He does use gabapentin.    Family, social, and medical histories are obtained and reviewed.  30-point, patient-recorded     Review of Systems is personally obtained and reviewed. Inclusive is no history of weight loss, change in appetite, recent change in activity level, change in bowel or bladder habits, fevers, chills, malaise, or night pain.    On exam husky appearing man no acute distress.  Stable gait.    Painless motion both hips.  His strength is intact.    Again we reviewed his updated imaging.  He has moderately severe stenosis at L1-2.  He has moderate stenosis at L4-5 and L5-S1.    He appears to be currently symptomatic with left proximal lumbar radiculopathy that correlates with the L1-2 level.    We talked about different treatment options both surgical and nonsurgical.  At present, he would like to avoid surgery which  I think is reasonable.    He has used meloxicam in the past and we will try this again along with a Medrol Dosepak.  He will follow-up as planned with Dr. Fisher and he will keep me updated on his progress.    ** Dictated with voice recognition software and not immediately reviewed for errors in grammar and/or spelling **

## 2024-05-08 NOTE — PROGRESS NOTES
Terrell jerome.  He is a 63-year-old retired  I have seen in the past.    He has had varying degrees of back and leg pain for several months.  After I saw him last summer, he improved significantly.  He was able to walk 4 to 5 miles at a time without discomfort.  In December however he had recurrent back pain and this has persisted.  He does have pain in his left anterior thigh.  It does not radiate into the left calf or foot.  No right leg symptoms.    He is due to see Dr. Fisher in the next 2 weeks.    He does use gabapentin.    Family, social, and medical histories are obtained and reviewed.  30-point, patient-recorded     Review of Systems is personally obtained and reviewed. Inclusive is no history of weight loss, change in appetite, recent change in activity level, change in bowel or bladder habits, fevers, chills, malaise, or night pain.    On exam husky appearing man no acute distress.  Stable gait.    Painless motion both hips.  His strength is intact.    Again we reviewed his updated imaging.  He has moderately severe stenosis at L1-2.  He has moderate stenosis at L4-5 and L5-S1.    He appears to be currently symptomatic with left proximal lumbar radiculopathy that correlates with the L1-2 level.    We talked about different treatment options both surgical and nonsurgical.  At present, he would like to avoid surgery which I think is reasonable.    He has used meloxicam in the past and we will try this again along with a Medrol Dosepak.  He will follow-up as planned with Dr. Fisher and he will keep me updated on his progress.    ** Dictated with voice recognition software and not immediately reviewed for errors in grammar and/or spelling **

## 2024-05-21 ENCOUNTER — OFFICE VISIT (OUTPATIENT)
Dept: PAIN MEDICINE | Facility: HOSPITAL | Age: 64
End: 2024-05-21
Payer: COMMERCIAL

## 2024-05-21 DIAGNOSIS — M54.16 LUMBAR RADICULITIS: ICD-10-CM

## 2024-05-21 PROCEDURE — 99214 OFFICE O/P EST MOD 30 MIN: CPT | Performed by: ANESTHESIOLOGY

## 2024-05-21 ASSESSMENT — PAIN SCALES - GENERAL: PAINLEVEL: 6

## 2024-05-21 NOTE — PROGRESS NOTES
Chief Complaint   Patient presents with    Back Pain    Extremity Pain     History Of Present Illness  Terrell Vernon is a 63 y.o. male with a past medical history of left knee replacement 1/2023 presents with low back pain with radiation into his left anterior thigh above the knee. He is a very active gentleman who is a retired  and walks 3 to 4 miles every day. Now, he complains that he has significant 8/10 pain after standing and walking for period of time. The pain is relieved with sitting. He also endorses some numbness/tingling in his anterior thigh. He does physical therapy stretches and exercises on a daily basis. Tried gabapentin and desipramine with minimal relief. He is interested in an injection as he would like to avoid surgery. The pain causes significant stress in the patient's life, specifically interferes with general activity, mood, walking ability, ability to perform tasks at home and/or work.  Patient participates in physical therapy and continues to perform physician directed exercises at home. Denies any bowel or bladder incontinence, saddle anesthesia, worsening pain, weakness or falls.     Past Medical History  He has a past medical history of Hyperlipidemia, Hypertension (07/2017), Knee pain (07/28/2022), and Obstructive sleep apnea syndrome (07/13/2020).    Surgical History  He has a past surgical history that includes Joint replacement (1/9/23); Vasectomy (06/1996); and [place] tooth extraction (6/86).     Social History  He reports that he has never smoked. He has quit using smokeless tobacco. He reports current alcohol use of about 1.0 - 2.0 standard drink of alcohol per week. He reports that he does not currently use drugs.    Family History  Family History   Problem Relation Name Age of Onset    Cancer Mother      Other (Bladder cancer) Mother      Hypertension Father      Dementia Father          Allergies  Patient has no known allergies.    Review of Symptoms:    Constitutional: Negative for chills, diaphoresis or fever  HENT: Negative for neck swelling  Eyes:.  Negative for eye pain  Respiratory:.  Negative for cough, shortness of breath or wheezing    Cardiovascular:.  Negative for chest pain or palpitations  Gastrointestinal:.  Negative for abdominal pain, nausea and vomiting  Genitourinary:.  Negative for urgency  Musculoskeletal:  Positive for back pain. Positive for joint pain. Denies falls within the past 3 months.  Skin: Negative for wounds or itching   Neurological: Negative for dizziness, seizures, loss of consciousness and weakness  Endo/Heme/Allergies: Does not bruise/bleed easily  Psychiatric/Behavioral: Negative for depression. The patient does not appear anxious.       PHYSICAL EXAM  Vitals signs reviewed  Constitutional:       General: Not in acute distress     Appearance: Normal appearance. Not ill-appearing.  HENT:     Head: Normocephalic and atraumatic  Eyes:     Conjunctiva/sclera: Conjunctivae normal  Cardiovascular:     Rate and Rhythm: Normal rate and regular rhythm  Pulmonary:     Effort: No respiratory distress  Abdominal:     Palpations: Abdomen is soft  Musculoskeletal: LYONS  Skin:     General: Skin is warm and dry  Neurological:     General: No focal deficit present  Psychiatric:         Mood and Affect: Mood normal         Behavior: Behavior normal    Advanced Exam   Inspection: No gross deformities, no surgical scars  Palpation: No tenderness of patient of lumbar midline, lumbar paraspinals, bilateral SI joints  ROM: Normal range of motion of the lumbar flexion extension  Motor: 5-5 strength upper and lower extremities  Sensory: Negative for sensory abnormalities in upper and lower extremities  Reflexes: 2+ reflexes bilateral upper and lower extremities  Lumbar: Negative straight leg raising bilaterally, negative for facet loading  Sacral: Negative Franklin, negative Gaenslen's  Hip: Negative for pain with anterior, lateral, posterior palpation of  hip joints, negative FADIR, negative for internal/external rotation of the hip, negative logroll     Last Recorded Vitals  There were no vitals taken for this visit.    Relevant Results  Current Outpatient Medications   Medication Instructions    acetaminophen (TYLENOL 8 HOUR) 650 mg, oral, Every 8 hours PRN, Do not crush, chew, or split.    amoxicillin (Amoxil) 500 mg capsule Take 4 Tablets 1 Hour Prior to Dental Procedure or Cleaning.    atorvastatin (LIPITOR) 20 mg, oral, Daily    desipramine (NORPRAMIN) 10 mg, oral, Nightly    desipramine (NORPRAMIN) 10 mg, oral, 3 times daily    gabapentin (Neurontin) 300 mg capsule Follow faxed titration schedule    gabapentin (NEURONTIN) 600 mg, oral, 3 times daily    hydroCHLOROthiazide (HYDRODiuril) 25 mg tablet TAKE 1 TABLET BY MOUTH IN THE  MORNING DAILY    lisinopril 5 mg, oral, Daily    LORazepam (ATIVAN) 1 mg, oral, 2 times daily PRN, Take 1 tablet 1 hour before MRI, take 1 tablet 15 minutes before MRI    melatonin 5 mg, oral, Nightly PRN    meloxicam (MOBIC) 15 mg, oral, Daily         MR lumbar spine wo IV contrast 04/30/2024    Narrative  Interpreted By:  Juanita Lott,  STUDY:  MR LUMBAR SPINE WO IV CONTRAST;  4/30/2024 11:57 am    INDICATION:  Signs/Symptoms:low back pain.    COMPARISON:  December 15, 2021    ACCESSION NUMBER(S):  [id number]    ORDERING CLINICIAN:  STEPHANIE PALMA    TECHNIQUE:  Sagittal T1, T2, STIR, axial T1 and T2 weighted images of the lumbar  spine were acquired.    FINDINGS:  Alignment: Limited coronal  images suggest a subtle levoconvex  curvature of the lumbar spine. There is again grade 1 retrolisthesis  of L5 on S1, L4 on L5, and L1 on L2.    Vertebrae/Intervertebral Discs: The vertebral bodies demonstrate  expected height.  Multilevel degenerative endplate signal changes,  endplate spurring, and loss of disc height remain most severe at  L1-2, L4-5, and L5-S1. There is multilevel disc desiccation.    Conus: The included portion of  the lower thoracic cord is somewhat  degraded by artifact. The conus is poorly delineated but appears to  most likely terminate at the superior L2 level.    T12-L1:  There is mild facet and ligamentum flavum hypertrophy  without central canal stenosis. L1-2: There is bilateral facet and  ligamentum flavum hypertrophy. There is again circumferential disc  bulging and endplate spurring with a superimposed left  paracentral/subarticular disc protrusion. There is effacement of the  lateral recesses, left greater than right with moderate to marked  narrowing of the thecal sac. There is at least mild bilateral  neuroforaminal narrowing. The findings are relatively similar from  the previous exam. L2-3: Facet and ligamentum flavum hypertrophy and  prominent epidural fat as well as mild disc bulging contribute to  moderate narrowing of the thecal sac with crowding of the nerve roots  of the cauda equina. There is narrowing of the lateral recesses and  minimal left-sided neuroforaminal stenosis. The findings are  relatively similar from the previous exam. L3-4: Facet and ligamentum  flavum hypertrophy and mildly prominent epidural fat as well as  subtle disc bulging contribute to narrowing of the lateral recesses  with moderate narrowing of the thecal sac and crowding of nerve roots  of the cauda equina. There is minimal neuroforaminal encroachment.  The findings are similar from the previous exam. L4-5: Facet and  ligamentum flavum hypertrophy, prominent epidural fat, and  circumferential disc bulging and endplate spurring are again  demonstrated. A superimposed central disc protrusion is also  suspected. There is effacement of the lateral recesses with severe  central canal stenosis and crowding of nerve roots of the cauda  equina. There is moderate right and severe left-sided neuroforaminal  narrowing. The findings are relatively similar from the previous  exam. L5-S1: Degenerative facet arthropathy, ligamentum  flavum  hypertrophy, and circumferential disc bulging and endplate spurring  are again noted. There is a right subarticular inferior disc  extrusion effacing the right lateral recess and producing mass effect  on the right S1 nerve root, similar from the previous exam. There is  mild central canal stenosis with severe bilateral neuroforaminal  narrowing, relatively similar from the previous examination.    The prevertebral and posterior paraspinous soft tissues are  unremarkable.    Incidental note is made of a retroaortic left renal vein, a normal  variant.    Impression  1. Redemonstration of multilevel degenerative changes of the lumbar  spine with central canal stenosis remaining most severe at L4-5,  similar from the previous examination.  2. Right subarticular inferior disc extrusion effaces the right  lateral recess and produces mass effect on the right S1 nerve root,  similar from the prior study. There is also again severe bilateral  neuroforaminal narrowing at this level.  3. Redemonstration of left paracentral/subarticular disc protrusion  at L1-2 with effacement of the left lateral recess.      MACRO:  None    Signed by: Juanita Lott 4/30/2024 2:18 PM  Dictation workstation:   [id number]      MR LUMBAR SPINE WO CONTRAST 12/15/2021    Narrative  EXAMINATION:  MRI OF THE LUMBAR SPINE WITHOUT CONTRAST  12/15/2021    TECHNIQUE:  Multiplanar multisequence MRI of the lumbar spine was performed without the administration of intravenous contrast.    COMPARISON:  None.    HISTORY:  ORDERING SYSTEM PROVIDED HISTORY: Lumbar radiculopathy, symptoms persist with conservative treatment;    TECHNOLOGIST PROVIDED HISTORY:  Illness/Other  Acuity: Chronic  Reason for Exam: Lumbar radiculopathy, symptoms persist with conservative treatment  Type of Encounter: Initial  Additional signs and symptoms: Chronic LBP w/ bi-lat radiculopathy lt. into the thigh.  pt will also have feet go numb at times    ORDERING SYSTEM  PROVIDED DIAGNOSIS CODES:  M48.061 Spinal stenosis, lumbar region, without neurogenic claudication    FINDINGS:  BONES/ALIGNMENT: Minimal retrolisthesis of L1 on L2.  Minimal retrolisthesis of L5 on S1.  Vertebral heights are normal.  Bone marrow signal is benign.  There are Modic type 2 endplate changes at L4-5 and L5-S1.    SPINAL CORD: The conus terminates normally.    SOFT TISSUES: No paraspinal mass identified.    L1-L2: Disc bulge causes mild thecal sac and mild bilateral foraminal stenosis.    L2-L3: Disc bulge causes mild bilateral foraminal stenosis.    L3-L4: Disc bulge causes mild bilateral foraminal stenosis.    L4-L5: Disc bulge, facet and ligamentum flavum hypertrophy cause mild thecal sac, moderate right foraminal and severe left foraminal stenosis.    L5-S1: Disc bulge, facet and ligamentum flavum hypertrophy cause mild thecal sac and severe bilateral foraminal stenosis.    Impression  Moderate-to-severe bilateral foraminal stenosis at L4-5 and L5-S1.    /r    Workstation ID:   [id number]     No image results found.       1. Lumbar radiculitis  [place] pain management    Epidural Steroid Injection         ASSESSMENT/PLAN  Terrell Vernon is a 63 y.o. male with a past medical history of left knee replacement 1/2023 presents with low back pain with radiation into his left anterior thigh above the knee. He is a very active gentleman who is a retired  and walks 3 to 4 miles every day. Now, he complains that he has significant 8/10 pain after standing and walking for period of time. The pain is relieved with sitting. He also endorses some numbness/tingling in his anterior thigh. He does physical therapy stretches and exercises on a daily basis. Tried gabapentin and desipramine with minimal relief. He is interested in an injection as he would like to avoid surgery. MRI reveals multilevel degenerative changes of the lumbar spine with central canal stenosis remaining most severe at L4-5, bilateral  foraminal stenosis at L1/L2. Based on history, available imaging and physical exam, the patient's primary pain etiology is likely due to spinal stenosis.     Our plan is as follows:  - L1-2 lumbar epidural steroid injection.  While he does have multiple areas of degenerative change his current course of radicular symptoms are most consistent at the L1-2 area.  We discussed the procedure itself as well as the risk, benefits, alternatives.  Will plan for left bias procedure.  - Continue to participate in physical therapy as well as physician directed home exercises.  - Continue pain medications as prescribed.     Patient seen and discussed with Dr. Fisher.    De Zimmerman, DO

## 2024-06-10 NOTE — H&P
History Of Present Illness  Terrell Vernon is a 63 y.o. male presenting with back pain.     Past Medical History  Past Medical History:   Diagnosis Date    Hyperlipidemia     Hypertension 07/2017    Knee pain 07/28/2022    Obstructive sleep apnea syndrome 07/13/2020       Surgical History  Past Surgical History:   Procedure Laterality Date    JOINT REPLACEMENT  1/9/23    VASECTOMY  06/1996    WISDOM TOOTH EXTRACTION  6/86        Social History  He reports that he has never smoked. He has quit using smokeless tobacco. He reports current alcohol use of about 1.0 - 2.0 standard drink of alcohol per week. He reports that he does not currently use drugs.    Family History  Family History   Problem Relation Name Age of Onset    Cancer Mother      Other (Bladder cancer) Mother      Hypertension Father      Dementia Father          Allergies  Patient has no known allergies.    Review of Systems   13 point ROS done and negative except for the above.   Physical Exam     General: NAD, well nourished   Eyes: Non-icteric sclera, EOMI  Ears, Nose, Mouth, and Throat: External ears and nose appear to be without deformity or rash. No lesions or masses noted. Hearing is grossly intact.   Neck: Trachea midline  Respiratory: Nonlabored breathing   Cardiovascular: No JVD  Skin: No rashes or open lesions/ulcers identified on skin.    Last Recorded Vitals  There were no vitals taken for this visit.    Relevant Results           Assessment/Plan   Problem List Items Addressed This Visit    None      Plan for left-sided bias L1/L2 lumbar interlaminar epidural steroid injection    Risks, benefits, alternatives to the procedure were discussed in detail and patient was amenable to proceeding.    Lacho Trejo MD

## 2024-06-14 ENCOUNTER — HOSPITAL ENCOUNTER (OUTPATIENT)
Dept: RADIOLOGY | Facility: HOSPITAL | Age: 64
Discharge: HOME | End: 2024-06-14
Payer: COMMERCIAL

## 2024-06-14 VITALS
HEART RATE: 63 BPM | RESPIRATION RATE: 18 BRPM | WEIGHT: 240 LBS | HEIGHT: 75 IN | BODY MASS INDEX: 29.84 KG/M2 | OXYGEN SATURATION: 99 % | TEMPERATURE: 97.8 F | SYSTOLIC BLOOD PRESSURE: 151 MMHG | DIASTOLIC BLOOD PRESSURE: 84 MMHG

## 2024-06-14 DIAGNOSIS — M54.16 LUMBAR RADICULITIS: ICD-10-CM

## 2024-06-14 PROCEDURE — 62323 NJX INTERLAMINAR LMBR/SAC: CPT | Performed by: ANESTHESIOLOGY

## 2024-06-14 ASSESSMENT — PAIN SCALES - GENERAL
PAINLEVEL_OUTOF10: 6

## 2024-06-14 ASSESSMENT — COLUMBIA-SUICIDE SEVERITY RATING SCALE - C-SSRS
1. IN THE PAST MONTH, HAVE YOU WISHED YOU WERE DEAD OR WISHED YOU COULD GO TO SLEEP AND NOT WAKE UP?: NO
6. HAVE YOU EVER DONE ANYTHING, STARTED TO DO ANYTHING, OR PREPARED TO DO ANYTHING TO END YOUR LIFE?: NO
2. HAVE YOU ACTUALLY HAD ANY THOUGHTS OF KILLING YOURSELF?: NO

## 2024-06-14 ASSESSMENT — PAIN - FUNCTIONAL ASSESSMENT
PAIN_FUNCTIONAL_ASSESSMENT: WONG-BAKER FACES
PAIN_FUNCTIONAL_ASSESSMENT: WONG-BAKER FACES
PAIN_FUNCTIONAL_ASSESSMENT: 0-10

## 2024-06-20 DIAGNOSIS — M48.07 LUMBOSACRAL STENOSIS WITH NEUROGENIC CLAUDICATION: ICD-10-CM

## 2024-06-25 RX ORDER — TOPIRAMATE 25 MG/1
TABLET ORAL
Qty: 196 TABLET | Refills: 0 | Status: SHIPPED | OUTPATIENT
Start: 2024-06-25

## 2024-06-26 DIAGNOSIS — M48.061 DEGENERATIVE LUMBAR SPINAL STENOSIS: ICD-10-CM

## 2024-06-27 RX ORDER — METHYLPREDNISOLONE 4 MG/1
4 TABLET ORAL ONCE
Qty: 21 EACH | OUTPATIENT
Start: 2024-06-27 | End: 2024-06-27

## 2024-07-15 ENCOUNTER — HOSPITAL ENCOUNTER (OUTPATIENT)
Dept: RADIOLOGY | Facility: HOSPITAL | Age: 64
Discharge: HOME | End: 2024-07-15
Payer: COMMERCIAL

## 2024-07-15 VITALS
BODY MASS INDEX: 29.84 KG/M2 | RESPIRATION RATE: 18 BRPM | HEART RATE: 57 BPM | SYSTOLIC BLOOD PRESSURE: 130 MMHG | TEMPERATURE: 98.1 F | WEIGHT: 240 LBS | OXYGEN SATURATION: 96 % | DIASTOLIC BLOOD PRESSURE: 83 MMHG | HEIGHT: 75 IN

## 2024-07-15 DIAGNOSIS — M48.07 LUMBOSACRAL STENOSIS WITH NEUROGENIC CLAUDICATION: ICD-10-CM

## 2024-07-15 PROCEDURE — 64483 NJX AA&/STRD TFRM EPI L/S 1: CPT | Performed by: ANESTHESIOLOGY

## 2024-07-15 PROCEDURE — 2500000004 HC RX 250 GENERAL PHARMACY W/ HCPCS (ALT 636 FOR OP/ED): Performed by: ANESTHESIOLOGY

## 2024-07-15 PROCEDURE — 64483 NJX AA&/STRD TFRM EPI L/S 1: CPT | Mod: LT | Performed by: ANESTHESIOLOGY

## 2024-07-15 PROCEDURE — 64484 NJX AA&/STRD TFRM EPI L/S EA: CPT | Performed by: ANESTHESIOLOGY

## 2024-07-15 RX ORDER — MIDAZOLAM HYDROCHLORIDE 1 MG/ML
INJECTION, SOLUTION INTRAMUSCULAR; INTRAVENOUS
Status: COMPLETED | OUTPATIENT
Start: 2024-07-15 | End: 2024-07-15

## 2024-07-15 ASSESSMENT — PAIN SCALES - GENERAL
PAINLEVEL_OUTOF10: 8
PAINLEVEL_OUTOF10: 6
PAINLEVEL_OUTOF10: 0 - NO PAIN
PAINLEVEL_OUTOF10: 6
PAINLEVEL_OUTOF10: 6
PAINLEVEL_OUTOF10: 0 - NO PAIN

## 2024-07-15 ASSESSMENT — COLUMBIA-SUICIDE SEVERITY RATING SCALE - C-SSRS
6. HAVE YOU EVER DONE ANYTHING, STARTED TO DO ANYTHING, OR PREPARED TO DO ANYTHING TO END YOUR LIFE?: NO
1. IN THE PAST MONTH, HAVE YOU WISHED YOU WERE DEAD OR WISHED YOU COULD GO TO SLEEP AND NOT WAKE UP?: NO
2. HAVE YOU ACTUALLY HAD ANY THOUGHTS OF KILLING YOURSELF?: NO

## 2024-07-15 ASSESSMENT — PAIN - FUNCTIONAL ASSESSMENT
PAIN_FUNCTIONAL_ASSESSMENT: WONG-BAKER FACES
PAIN_FUNCTIONAL_ASSESSMENT: 0-10

## 2024-07-15 NOTE — H&P
"HISTORY AND PHYSICAL    History Of Present Illness  Terrell Vernon is a 63 y.o. male presenting with chronic pain.  Here for Lumbar transforaminal epidural steroid injection(s) on the left        Past Medical History  Past Medical History:   Diagnosis Date    Hyperlipidemia     Hypertension 07/2017    Knee pain 07/28/2022    Obstructive sleep apnea syndrome 07/13/2020       Surgical History  Past Surgical History:   Procedure Laterality Date    JOINT REPLACEMENT  1/9/23    VASECTOMY  06/1996    WISDOM TOOTH EXTRACTION  6/86        Social History  He reports that he has never smoked. He has quit using smokeless tobacco. He reports current alcohol use of about 1.0 - 2.0 standard drink of alcohol per week. He reports that he does not currently use drugs.    Family History  Family History   Problem Relation Name Age of Onset    Cancer Mother      Other (Bladder cancer) Mother      Hypertension Father      Dementia Father          Allergies  Patient has no known allergies.    Review of Systems   12 point ROS done and negative except for the above.   Physical Exam     General: NAD, well groomed, well nourished  Eyes: Non-icteric sclera, EOMI  Ears, Nose, Mouth, and Throat: External ears and nose appear to be without deformity or rash. No lesions or masses noted. Hearing is grossly intact.   Neck: Trachea midline  Respiratory: Nonlabored breathing   Cardiovascular: No peripheral edema   Skin: No rashes or open lesions/ulcers identified on skin.    Last Recorded Vitals  Blood pressure 151/87, pulse 67, temperature 36.7 °C (98.1 °F), resp. rate 18, height 1.905 m (6' 3\"), weight 109 kg (240 lb), SpO2 97%.    Relevant Results           Assessment/Plan       Risks, benefits, alternatives discussed. All questions answered to the best of my ability. Patient agrees to proceed.   -We will proceed with planned procedure        Toams Castañeda MD  Chronic Pain Fellow  [facility]    "

## 2024-10-01 DIAGNOSIS — I10 PRIMARY HYPERTENSION: ICD-10-CM

## 2024-10-02 PROBLEM — M17.12 ARTHRITIS OF LEFT KNEE: Status: RESOLVED | Noted: 2023-09-02 | Resolved: 2024-10-02

## 2024-10-02 RX ORDER — PREGABALIN 75 MG/1
1 CAPSULE ORAL
COMMUNITY
Start: 2024-09-29

## 2024-10-02 RX ORDER — HYDROCHLOROTHIAZIDE 25 MG/1
TABLET ORAL
Qty: 90 TABLET | Refills: 0 | Status: SHIPPED | OUTPATIENT
Start: 2024-10-02

## 2024-11-06 DIAGNOSIS — E78.5 HYPERLIPIDEMIA, UNSPECIFIED HYPERLIPIDEMIA TYPE: ICD-10-CM

## 2024-11-06 DIAGNOSIS — I10 PRIMARY HYPERTENSION: ICD-10-CM

## 2024-11-07 RX ORDER — LISINOPRIL 5 MG/1
5 TABLET ORAL DAILY
Qty: 90 TABLET | Refills: 1 | Status: SHIPPED | OUTPATIENT
Start: 2024-11-07

## 2024-11-07 RX ORDER — ATORVASTATIN CALCIUM 20 MG/1
20 TABLET, FILM COATED ORAL DAILY
Qty: 90 TABLET | Refills: 1 | Status: SHIPPED | OUTPATIENT
Start: 2024-11-07

## 2024-12-08 DIAGNOSIS — I10 PRIMARY HYPERTENSION: ICD-10-CM

## 2024-12-09 RX ORDER — HYDROCHLOROTHIAZIDE 25 MG/1
TABLET ORAL
Qty: 90 TABLET | Refills: 3 | Status: SHIPPED | OUTPATIENT
Start: 2024-12-09

## 2024-12-16 ENCOUNTER — LAB (OUTPATIENT)
Dept: LAB | Facility: LAB | Age: 64
End: 2024-12-16
Payer: COMMERCIAL

## 2024-12-16 ENCOUNTER — OFFICE VISIT (OUTPATIENT)
Dept: PRIMARY CARE | Facility: CLINIC | Age: 64
End: 2024-12-16
Payer: COMMERCIAL

## 2024-12-16 VITALS
BODY MASS INDEX: 29.59 KG/M2 | OXYGEN SATURATION: 97 % | SYSTOLIC BLOOD PRESSURE: 118 MMHG | DIASTOLIC BLOOD PRESSURE: 78 MMHG | HEART RATE: 54 BPM | WEIGHT: 238 LBS | HEIGHT: 75 IN | TEMPERATURE: 97 F

## 2024-12-16 DIAGNOSIS — Z00.00 ANNUAL PHYSICAL EXAM: ICD-10-CM

## 2024-12-16 DIAGNOSIS — Z13.220 SCREENING FOR CHOLESTEROL LEVEL: ICD-10-CM

## 2024-12-16 DIAGNOSIS — Z12.5 SCREENING FOR PROSTATE CANCER: ICD-10-CM

## 2024-12-16 DIAGNOSIS — Z00.00 ANNUAL PHYSICAL EXAM: Primary | ICD-10-CM

## 2024-12-16 DIAGNOSIS — Z13.1 SCREENING FOR DIABETES MELLITUS: ICD-10-CM

## 2024-12-16 LAB
ALBUMIN SERPL BCP-MCNC: 4.4 G/DL (ref 3.4–5)
ALP SERPL-CCNC: 58 U/L (ref 33–136)
ALT SERPL W P-5'-P-CCNC: 22 U/L (ref 10–52)
ANION GAP SERPL CALCULATED.3IONS-SCNC: 11 MMOL/L (ref 10–20)
AST SERPL W P-5'-P-CCNC: 22 U/L (ref 9–39)
BILIRUB SERPL-MCNC: 0.8 MG/DL (ref 0–1.2)
BUN SERPL-MCNC: 20 MG/DL (ref 6–23)
CALCIUM SERPL-MCNC: 9.3 MG/DL (ref 8.6–10.3)
CHLORIDE SERPL-SCNC: 102 MMOL/L (ref 98–107)
CHOLEST SERPL-MCNC: 141 MG/DL (ref 0–199)
CHOLEST/HDLC SERPL: 3.7 {RATIO}
CO2 SERPL-SCNC: 29 MMOL/L (ref 21–32)
CREAT SERPL-MCNC: 1.07 MG/DL (ref 0.5–1.3)
EGFRCR SERPLBLD CKD-EPI 2021: 77 ML/MIN/1.73M*2
ERYTHROCYTE [DISTWIDTH] IN BLOOD BY AUTOMATED COUNT: 12.4 % (ref 11.5–14.5)
EST. AVERAGE GLUCOSE BLD GHB EST-MCNC: 126 MG/DL
GLUCOSE SERPL-MCNC: 105 MG/DL (ref 74–99)
HBA1C MFR BLD: 6 %
HCT VFR BLD AUTO: 44.6 % (ref 41–52)
HDLC SERPL-MCNC: 37.7 MG/DL
HGB BLD-MCNC: 15.3 G/DL (ref 13.5–17.5)
LDLC SERPL CALC-MCNC: 80 MG/DL
MCH RBC QN AUTO: 29.8 PG (ref 26–34)
MCHC RBC AUTO-ENTMCNC: 34.3 G/DL (ref 32–36)
MCV RBC AUTO: 87 FL (ref 80–100)
NON HDL CHOLESTEROL: 103 MG/DL (ref 0–149)
NRBC BLD-RTO: 0 /100 WBCS (ref 0–0)
PLATELET # BLD AUTO: 250 X10*3/UL (ref 150–450)
POTASSIUM SERPL-SCNC: 4.3 MMOL/L (ref 3.5–5.3)
PROT SERPL-MCNC: 6.5 G/DL (ref 6.4–8.2)
PSA SERPL-MCNC: 0.31 NG/ML
RBC # BLD AUTO: 5.14 X10*6/UL (ref 4.5–5.9)
SODIUM SERPL-SCNC: 138 MMOL/L (ref 136–145)
TRIGL SERPL-MCNC: 119 MG/DL (ref 0–149)
VLDL: 24 MG/DL (ref 0–40)
WBC # BLD AUTO: 5.8 X10*3/UL (ref 4.4–11.3)

## 2024-12-16 PROCEDURE — 3074F SYST BP LT 130 MM HG: CPT | Performed by: INTERNAL MEDICINE

## 2024-12-16 PROCEDURE — 3078F DIAST BP <80 MM HG: CPT | Performed by: INTERNAL MEDICINE

## 2024-12-16 PROCEDURE — 80061 LIPID PANEL: CPT

## 2024-12-16 PROCEDURE — 80053 COMPREHEN METABOLIC PANEL: CPT

## 2024-12-16 PROCEDURE — 85027 COMPLETE CBC AUTOMATED: CPT

## 2024-12-16 PROCEDURE — 83036 HEMOGLOBIN GLYCOSYLATED A1C: CPT

## 2024-12-16 PROCEDURE — 99396 PREV VISIT EST AGE 40-64: CPT | Performed by: INTERNAL MEDICINE

## 2024-12-16 PROCEDURE — 3008F BODY MASS INDEX DOCD: CPT | Performed by: INTERNAL MEDICINE

## 2024-12-16 PROCEDURE — 36415 COLL VENOUS BLD VENIPUNCTURE: CPT

## 2024-12-16 PROCEDURE — 84153 ASSAY OF PSA TOTAL: CPT

## 2024-12-16 ASSESSMENT — PATIENT HEALTH QUESTIONNAIRE - PHQ9
SUM OF ALL RESPONSES TO PHQ9 QUESTIONS 1 AND 2: 0
2. FEELING DOWN, DEPRESSED OR HOPELESS: NOT AT ALL
1. LITTLE INTEREST OR PLEASURE IN DOING THINGS: NOT AT ALL

## 2024-12-16 ASSESSMENT — ENCOUNTER SYMPTOMS
OCCASIONAL FEELINGS OF UNSTEADINESS: 0
DEPRESSION: 0
LOSS OF SENSATION IN FEET: 0

## 2024-12-16 ASSESSMENT — PAIN SCALES - GENERAL: PAINLEVEL_OUTOF10: 5

## 2024-12-18 ENCOUNTER — TELEPHONE (OUTPATIENT)
Dept: PRIMARY CARE | Facility: CLINIC | Age: 64
End: 2024-12-18
Payer: COMMERCIAL

## 2024-12-18 NOTE — TELEPHONE ENCOUNTER
Tried calling patient, but when line picked up both times, there was no answer. Messaged patient via [employer]

## 2024-12-18 NOTE — TELEPHONE ENCOUNTER
----- Message from Kg Jones sent at 12/17/2024  5:03 PM EST -----  All labs are stable except mild elevation of the blood sugars.  Patient should watch on sugars in the diet.

## 2024-12-19 NOTE — PROGRESS NOTES
[facility]: [facility]  PROGRESS NOTE      Terrell Vernon is a 64 y.o. male that is being seen  today for Annual Exam.  Subjective   Patient is a 64-year-old male with a history of hypertension, hyperlipidemia, chronic low back pain from lumbar stenosis who is being seen for annual physical examination.  Patient does follow-up with the pain management and had injections in his back.  Patient is due for blood work to be done.  Blood pressure is fairly controlled.  Denies any other hospitalization.  Denies any issues with the medications.  Patient is due for colonoscopy but patient is refusing at present.      ROS  Negative for fever or chills  Negative for sore throat, ear pain, nasal discharge  Negative for cough, shortness of breath or wheezing  Negative for chest pain, palpitations, swelling of legs  Negative for abdominal pain, constipation, diarrhea, blood in the stools  Negative for urinary complaints  Negative for headache, dizziness, weakness or numbness  Positive for chronic low back pain and difficulty in walking  Negative for depression or anxiety  All other systems reviewed and were negative   Vitals:    12/16/24 0755   BP: 118/78   Pulse: 54   Temp: 36.1 °C (97 °F)   SpO2: 97%      Vitals:    12/16/24 0755   Weight: 108 kg (238 lb)     Body mass index is 29.75 kg/m².  Physical Exam  Constitutional: Patient does not appear to be in any acute distress  Head and Face: NCAT  Eyes: Normal external exam, EOMI  ENT: Normal external inspection of ears and nose. Oropharynx normal.  Cardiovascular: RRR, S1/S2, no murmurs, rubs, or gallops, radial pulses +2, no edema of extremities  Pulmonary: CTAB, no respiratory distress.  Abdomen: +BS, soft, non-tender, nondistended, no guarding or rebound, no masses noted  MSK: Mild tenderness over the paraspinal muscles  Skin- No lesions, contusions, or erythema.  Peripheral puslses palpable bilaterally 2+  Neuro: AAO X3, Cranial nerves 2-12 grossly  intact,DTR 2+ in all 4 limbs   Psychiatric: Judgment intact. Appropriate mood and behavior    LABS   [unfilled]  Lab Results   Component Value Date    GLUCOSE 105 (H) 12/16/2024    CALCIUM 9.3 12/16/2024     12/16/2024    K 4.3 12/16/2024    CO2 29 12/16/2024     12/16/2024    BUN 20 12/16/2024    CREATININE 1.07 12/16/2024     Lab Results   Component Value Date    ALT 22 12/16/2024    AST 22 12/16/2024    ALKPHOS 58 12/16/2024    BILITOT 0.8 12/16/2024     Lab Results   Component Value Date    WBC 5.8 12/16/2024    HGB 15.3 12/16/2024    HCT 44.6 12/16/2024    MCV 87 12/16/2024     12/16/2024     Lab Results   Component Value Date    CHOL 141 12/16/2024    CHOL 169 12/11/2023    CHOL 130 (L) 12/08/2022     Lab Results   Component Value Date    HDL 37.7 12/16/2024    HDL 48.0 12/11/2023    HDL 40 (L) 12/08/2022     Lab Results   Component Value Date    LDLCALC 80 12/16/2024    LDLCALC 99 12/11/2023    LDLCALC 74 12/08/2022     Lab Results   Component Value Date    TRIG 119 12/16/2024    TRIG 112 12/11/2023    TRIG 80 12/08/2022     Lab Results   Component Value Date    HGBA1C 6.0 (H) 12/16/2024     Other labs not included in the list above were reviewed either before or during this encounter.    History    Past Medical History:   Diagnosis Date    Arthritis of left knee 09/02/2023    Hyperlipidemia     Hypertension 07/2017    Knee pain 07/28/2022    Obstructive sleep apnea syndrome 07/13/2020     Past Surgical History:   Procedure Laterality Date    JOINT REPLACEMENT  1/9/23    VASECTOMY  06/1996    WISDOM TOOTH EXTRACTION  6/86     Family History   Problem Relation Name Age of Onset    Cancer Mother      Other (Bladder cancer) Mother      Hypertension Father      Dementia Father       No Known Allergies  Current Outpatient Medications on File Prior to Visit   Medication Sig Dispense Refill    acetaminophen (Tylenol 8 HOUR) 650 mg ER tablet Take 1 tablet (650 mg) by mouth every 8 hours if needed  for mild pain (1 - 3). Do not crush, chew, or split.      amoxicillin (Amoxil) 500 mg capsule Take 4 Tablets 1 Hour Prior to Dental Procedure or Cleaning. 4 capsule 6    atorvastatin (Lipitor) 20 mg tablet TAKE 1 TABLET BY MOUTH ONCE  DAILY 90 tablet 1    hydroCHLOROthiazide (HYDRODiuril) 25 mg tablet TAKE 1 TABLET BY MOUTH IN THE  MORNING DAILY 90 tablet 3    lisinopril 5 mg tablet TAKE 1 TABLET BY MOUTH ONCE  DAILY 90 tablet 1    melatonin 5 mg tablet Take 1 tablet (5 mg) by mouth as needed at bedtime for sleep.      meloxicam (Mobic) 15 mg tablet Take 1 tablet (15 mg) by mouth once daily. 30 tablet 11    pregabalin (Lyrica) 75 mg capsule Take 1 capsule (75 mg) by mouth every 12 hours.      [DISCONTINUED] desipramine (Norpramin) 10 mg tablet Take 1 tablet (10 mg) by mouth once daily at bedtime. 30 tablet 11    [DISCONTINUED] desipramine (Norpramin) 10 mg tablet Take 1 tablet (10 mg) by mouth 3 times a day. 270 tablet 3    [DISCONTINUED] gabapentin (Neurontin) 300 mg capsule Follow faxed titration schedule 180 capsule 0    [DISCONTINUED] gabapentin (Neurontin) 600 mg tablet Take 1 tablet (600 mg) by mouth 3 times a day. 90 tablet 5    [DISCONTINUED] LORazepam (Ativan) 1 mg tablet Take 1 tablet (1 mg) by mouth 2 times a day as needed for anxiety for up to 1 day. Take 1 tablet 1 hour before MRI, take 1 tablet 15 minutes before MRI 2 tablet 0    [DISCONTINUED] topiramate (Topamax) 25 mg tablet Take 1 tab qHSx 7days --> 1 BID x7 days--> 1 qAM 2 qHS x 7 days--> 2 BID x 7 days-->2 qAM, 3 qHS x7 days--> 3 BIDx 7 days --> 3 qAM, 4 at bedtime x7days -->4 BID and call the office for the 100 mg strength 196 tablet 0     No current facility-administered medications on file prior to visit.     Immunization History   Administered Date(s) Administered    COVID-19, mRNA, LNP-S, PF, 30 mcg/0.3 mL dose 03/11/2021, 04/01/2021    Flu vaccine (IIV4), preservative free *Check age/dose* 09/26/2020, 09/13/2022, 10/06/2023    Flu vaccine,  trivalent, preservative free, age 6 months and greater (Fluarix/Fluzone/Flulaval) 10/01/2024    Moderna SARS-CoV-2 Vaccination 12/08/2021    Pfizer COVID-19 vaccine, 12 years and older, (30mcg/0.3mL) (Comirnaty) 10/06/2023, 10/01/2024    Pfizer COVID-19 vaccine, bivalent, age 12 years and older (30 mcg/0.3 mL) 09/13/2022    RSV, 60 Years And Older (AREXVY) 10/15/2024    Tdap vaccine, age 7 year and older (BOOSTRIX, ADACEL) 07/13/2020, 10/15/2024    Zoster vaccine, recombinant, adult (SHINGRIX) 01/04/2022, 03/07/2022     Patient's medical history was reviewed and updated either before or during this encounter.  ASSESSMENT / PLAN:  Diagnoses and all orders for this visit:  Annual physical exam  -     CBC; Future  -     Comprehensive Metabolic Panel; Future  Screening for cholesterol level  -     Lipid Panel; Future  Screening for diabetes mellitus  -     Hemoglobin A1C; Future  Screening for prostate cancer  -     Prostate Specific Antigen; Future    Patient is being seen for annual physical examination.  Patient overall has been doing well.  He has chronic low back pain and follows up with the pain management.  Patient's blood pressure is fairly controlled.  Patient is due for lab work to be done.      Kg Jones MD

## 2025-01-13 ENCOUNTER — APPOINTMENT (OUTPATIENT)
Dept: SLEEP MEDICINE | Facility: CLINIC | Age: 65
End: 2025-01-13
Payer: COMMERCIAL

## 2025-01-13 VITALS
SYSTOLIC BLOOD PRESSURE: 136 MMHG | WEIGHT: 237 LBS | HEIGHT: 75 IN | OXYGEN SATURATION: 99 % | DIASTOLIC BLOOD PRESSURE: 76 MMHG | BODY MASS INDEX: 29.47 KG/M2 | HEART RATE: 60 BPM

## 2025-01-13 DIAGNOSIS — G47.33 OSA (OBSTRUCTIVE SLEEP APNEA): Primary | ICD-10-CM

## 2025-01-13 DIAGNOSIS — R20.2 PARESTHESIA OF BOTH HANDS: ICD-10-CM

## 2025-01-13 PROCEDURE — 99213 OFFICE O/P EST LOW 20 MIN: CPT | Performed by: PSYCHIATRY & NEUROLOGY

## 2025-01-13 PROCEDURE — 3075F SYST BP GE 130 - 139MM HG: CPT | Performed by: PSYCHIATRY & NEUROLOGY

## 2025-01-13 PROCEDURE — 1036F TOBACCO NON-USER: CPT | Performed by: PSYCHIATRY & NEUROLOGY

## 2025-01-13 PROCEDURE — 3008F BODY MASS INDEX DOCD: CPT | Performed by: PSYCHIATRY & NEUROLOGY

## 2025-01-13 PROCEDURE — 3078F DIAST BP <80 MM HG: CPT | Performed by: PSYCHIATRY & NEUROLOGY

## 2025-01-13 NOTE — PROGRESS NOTES
Patient: Terrell Vernon    [id number]  : 1960 -- AGE 64 y.o.    Provider: Sotero Chu MD     [facility]   Service Date: 2025              [facility]  Follow-up Note          HPI: Terrell Vernon is a 64 y.o. male with severe sleep apnea (mixed per distribution of respiratory events on his  sleep study, see below) on CPAP, with PMH notable for HTN, HLD, prediabetes, and spinal stenosis. He is here today for annual follow up visit.     Doing well with CPAP. He would like to try a different style of mask. Currently using a standard full face mask. Would like a hybrid FFM. Just got a shipment of new supplies a few weeks ago.    Speaking to his PCP about how his distal arms and hands go numb when he wakes up in the morning.    Starts the night supine, but moves to his side and back to supine throughout the night.    History of bone spur at C-6. Lumbar pain is improving, neck pain is worsening. Has a neck traction device at home and an inversion table.    No daytime sleepiness. No napping.    Sleeping 6-8 hours/night.    PAP DEVICE   DME: [facility]  Setup date: current machine - 2019  Type: CPAP  Finding benefit: better quality sleep    Prior Sleep studies:   Split night PSG at [facility], 2012: pt's weighed 245 lbs. Symptoms were snoring, difficulty initiating and maintainig sleep, witnesses pauses in breathing, and daytime somnolence. Severe sleep apnea with AHI4% 45.6/h with NMA 22.9/h during diagnostic portion, worse when supine (93.5/h during the 61.0 minutes supine vs 9.6/h during the 67.0 minutes non-supine). There were 31 hypopneas, 26 obstructive apneas and 49 central apneas during the diagnostic portion. Severe respiratory related sleep fragmentation with 79 arousals/h. Loud snoring. SpO2 lucero 87%, baseline 96%. CPAP mask was the Mirage Quattro full face mask (medium) due to mouth breathing. AutoCPAP 7-11 cm  H2O reduced AHI to 3.6/h. During the treatment portion the respiratory events consisted of 10 central apneas and arousal index decreased overall to 14/h. Leg movement index during diagnostic portion was 6.1/h and 9.7/h during the CPAP portion.               REVIEW OF MACHINE DOWNLOAD:   Date Range: 24-25  % Days used: 100%  % Days with Usage >= 4 hrs: 100%  Average usage days used: 9 h 12 min   Settin-20 cmH2O, EPR 2  95th percentile pressure: 12.7 cmH2O, median pressure 9.1 cmH2O, max pressure 14.9 cmH2O  95th percentile leak: 19 LPM, median leak 4 LPM  Residual AHI: 2.0      Patient Active Problem List   Diagnosis    Hyperlipidemia    Hypertension    Internal derangement of knee joint    Osteoarthritis of knees, bilateral    Nontraumatic incomplete tear of left rotator cuff    FIDEL (obstructive sleep apnea)    Prediabetes    Spinal stenosis, lumbar region with neurogenic claudication    Chronic low back pain     Past Medical History:   Diagnosis Date    Arthritis of left knee 2023    Hyperlipidemia     Hypertension 2017    Knee pain 2022    Obstructive sleep apnea syndrome 2020     Past Surgical History:   Procedure Laterality Date    JOINT REPLACEMENT  23    VASECTOMY  1996    WISDOM TOOTH EXTRACTION       Current Outpatient Medications on File Prior to Visit   Medication Sig Dispense Refill    acetaminophen (Tylenol 8 HOUR) 650 mg ER tablet Take 1 tablet (650 mg) by mouth every 8 hours if needed for mild pain (1 - 3). Do not crush, chew, or split.      amoxicillin (Amoxil) 500 mg capsule Take 4 Tablets 1 Hour Prior to Dental Procedure or Cleaning. (Patient taking differently: PRIOR to dental work/cleaning) 4 capsule 6    atorvastatin (Lipitor) 20 mg tablet TAKE 1 TABLET BY MOUTH ONCE  DAILY 90 tablet 1    hydroCHLOROthiazide (HYDRODiuril) 25 mg tablet TAKE 1 TABLET BY MOUTH IN THE  MORNING DAILY 90 tablet 3    lisinopril 5 mg tablet TAKE 1 TABLET BY MOUTH ONCE  DAILY  "90 tablet 1    melatonin 5 mg tablet Take 1 tablet (5 mg) by mouth as needed at bedtime for sleep.      meloxicam (Mobic) 15 mg tablet Take 1 tablet (15 mg) by mouth once daily. 30 tablet 11    pregabalin (Lyrica) 75 mg capsule Take 1 capsule (75 mg) by mouth every 12 hours.       No current facility-administered medications on file prior to visit.       PHYSICAL EXAMINATION:   Vitals:    01/13/25 1006   BP: 136/76   BP Location: Left arm   BP Cuff Size: Adult   Pulse: 60   SpO2: 99%   Weight: 108 kg (237 lb)   Height: 1.905 m (6' 3\")     Body mass index is 29.62 kg/m².  General: Awake. Alert. Comfortable. No apparent distress.   Speech: Normal.  Comprehension: Normal.  Mood: Stable.  Affect: Appropriate.  Pul:         Normal respiratory effort.   Abd:        Obese   Neuro: Alert, well-oriented. Cranial nerves II-XII grossly normal and symmetric.  No wasting of hand muscles. Negative Phalen's test and negative Tinel's test at the wrist bilaterally. No abnormal movements noted. Normal gait      ASSESSMENT AND PLAN: Mr. Terrell Vernon is a 64 y.o. male with severe sleep apnea doing great on CPAP. He would like a hybrid full face mask instead of his standard full face mask. Sleep is disrupted by paresthesias in bilateral hands - likely from C-spine.      #sleep apnea, severe - Patient's sleep apnea is under very good control with CPAP, he is deriving significant subjective benefit from treatment, his compliance is excellent, and mask leak is well controlled overall.  -provided sample AirFit F40 hybrid full face mask    #paresthesias   -pt to work with PCP and ortho team    All of the above was discussed with the patient in detail. He voiced an understanding of the above and was agreeable to proceed further as advised.     20 minutes were spent with the patient plus time spent reviewing the chart, updating the chart as needed, and documenting.     FOLLOW UP: 1 year, sooner if needed  "

## 2025-01-20 ENCOUNTER — TELEPHONE (OUTPATIENT)
Dept: ORTHOPEDIC SURGERY | Facility: HOSPITAL | Age: 65
End: 2025-01-20

## 2025-01-20 DIAGNOSIS — Z47.1 AFTERCARE FOLLOWING LEFT KNEE JOINT REPLACEMENT SURGERY: ICD-10-CM

## 2025-01-20 DIAGNOSIS — Z96.652 AFTERCARE FOLLOWING LEFT KNEE JOINT REPLACEMENT SURGERY: ICD-10-CM

## 2025-01-20 RX ORDER — AMOXICILLIN 500 MG/1
CAPSULE ORAL
Qty: 4 CAPSULE | Refills: 6 | Status: SHIPPED | OUTPATIENT
Start: 2025-01-20

## 2025-01-20 NOTE — TELEPHONE ENCOUNTER
Patient is requesting medication refills for DENTLA ANTIBIOTICS.  Patient had surgery L TKA on 1/9/23.   Patient Allergy list is up to date.  Patient pharmacy is up to date.    Thanks,  Marva Villa

## 2025-04-08 DIAGNOSIS — I10 PRIMARY HYPERTENSION: ICD-10-CM

## 2025-04-08 DIAGNOSIS — E78.5 HYPERLIPIDEMIA, UNSPECIFIED HYPERLIPIDEMIA TYPE: ICD-10-CM

## 2025-04-09 RX ORDER — ATORVASTATIN CALCIUM 20 MG/1
20 TABLET, FILM COATED ORAL DAILY
Qty: 90 TABLET | Refills: 3 | Status: SHIPPED | OUTPATIENT
Start: 2025-04-09

## 2025-04-09 RX ORDER — LISINOPRIL 5 MG/1
5 TABLET ORAL DAILY
Qty: 90 TABLET | Refills: 3 | Status: SHIPPED | OUTPATIENT
Start: 2025-04-09

## 2025-04-09 NOTE — TELEPHONE ENCOUNTER
lisinopril   Dispense: 90 tablet   Refills: 1 ordered   Pharmacy: [address] (Ph: [phone])   Order Details  Ordered on: 11/07/24   atorvastatin   Dispense: 90 tablet   Refills: 1 ordered   Pharmacy: [address] (Ph: [phone])   Order Details  Ordered on: 11/07/24   Lv-12/16/2024  NV-12/22/2025

## 2025-08-28 ENCOUNTER — TELEPHONE (OUTPATIENT)
Dept: SLEEP MEDICINE | Facility: CLINIC | Age: 65
End: 2025-08-28
Payer: COMMERCIAL

## 2025-08-28 DIAGNOSIS — G47.30 SLEEP APNEA, UNSPECIFIED TYPE: Primary | ICD-10-CM

## 2026-01-12 ENCOUNTER — APPOINTMENT (OUTPATIENT)
Dept: SLEEP MEDICINE | Facility: CLINIC | Age: 66
End: 2026-01-12
Payer: COMMERCIAL